# Patient Record
Sex: FEMALE | Race: WHITE | NOT HISPANIC OR LATINO | Employment: OTHER | URBAN - METROPOLITAN AREA
[De-identification: names, ages, dates, MRNs, and addresses within clinical notes are randomized per-mention and may not be internally consistent; named-entity substitution may affect disease eponyms.]

---

## 2017-01-31 ENCOUNTER — TELEPHONE (OUTPATIENT)
Dept: FAMILY MEDICINE CLINIC | Facility: CLINIC | Age: 72
End: 2017-01-31

## 2017-01-31 NOTE — TELEPHONE ENCOUNTER
INFORMED PT    ----- Message from Mark Galicia Jr., MD sent at 1/31/2017  4:53 PM EST -----  Contact: PATIENT [phone]  Suggest restricts salt intake get daily weights for the next several days if there is getting worse let us know.  ----- Message -----     From: Germaine Elias MA     Sent: 1/31/2017   2:18 PM       To: Mark Galicia Jr., MD        ----- Message -----     From: Karon Zazueta     Sent: 1/31/2017   2:14 PM       To: Germaine Elias MA    PATIENT IS HAVING SWELLING IN FEET AND ANKLES AND WANTS TO KNOW WHAT SHE NEEDS TO DO. PATIENT HAS AN APPOINTMENT TO COME IN ON THE 13 TH

## 2017-02-13 ENCOUNTER — OFFICE VISIT (OUTPATIENT)
Dept: FAMILY MEDICINE CLINIC | Facility: CLINIC | Age: 72
End: 2017-02-13

## 2017-02-13 VITALS
WEIGHT: 206 LBS | BODY MASS INDEX: 35.17 KG/M2 | HEIGHT: 64 IN | SYSTOLIC BLOOD PRESSURE: 190 MMHG | OXYGEN SATURATION: 96 % | TEMPERATURE: 97.5 F | HEART RATE: 90 BPM | DIASTOLIC BLOOD PRESSURE: 108 MMHG

## 2017-02-13 DIAGNOSIS — R60.0 BILATERAL LEG EDEMA: ICD-10-CM

## 2017-02-13 DIAGNOSIS — I10 HYPERTENSION, ESSENTIAL: Primary | ICD-10-CM

## 2017-02-13 PROCEDURE — 99213 OFFICE O/P EST LOW 20 MIN: CPT | Performed by: INTERNAL MEDICINE

## 2017-02-13 RX ORDER — CLONIDINE HYDROCHLORIDE 0.2 MG/1
0.2 TABLET ORAL 2 TIMES DAILY
Qty: 60 TABLET | Refills: 2 | Status: SHIPPED | OUTPATIENT
Start: 2017-02-13 | End: 2017-07-06 | Stop reason: SDUPTHER

## 2017-02-13 RX ORDER — RANITIDINE 150 MG/1
150 TABLET ORAL 2 TIMES DAILY
Qty: 60 TABLET | Refills: 11 | Status: SHIPPED | OUTPATIENT
Start: 2017-02-13 | End: 2018-04-21 | Stop reason: SDUPTHER

## 2017-02-13 RX ORDER — TRIAMCINOLONE ACETONIDE 1 MG/G
CREAM TOPICAL 2 TIMES DAILY
Qty: 28.4 G | Refills: 2 | Status: SHIPPED | OUTPATIENT
Start: 2017-02-13 | End: 2022-01-13 | Stop reason: SDUPTHER

## 2017-02-13 NOTE — PROGRESS NOTES
Subjective   Jeanie Tristan is a 71 y.o. female.   Blood pressure, side effects from higher dose of clonidine cannot handle drowsiness.  Is been taking whole tablet in the evening and half tablet only the daytime.  Also is having some increasing edema of her lower extremities.  Patient has has absent water pill.  Does think that she had a house with HCTZ.  Was on a stronger water pill perhaps Lasix she knows she stopped it thinks maybe us for itching but no true rash with that.  Patient does report a true sulfa allergy in addition.  Legs to decrease in size at night.  History of Present Illness   No clearcut explanation of leg edema no history of DVT or any venous insufficiency problems.    Review of Systems   Cardiovascular: Positive for leg swelling.   All other systems reviewed and are negative.      Objective   Vitals:    02/13/17 1457   BP: (!) 190/108   Pulse: 90   Temp: 97.5 °F (36.4 °C)   SpO2: 96%   Weight: 206 lb (93.4 kg)     Physical Exam   Constitutional: She appears well-developed and well-nourished.   Cardiovascular: Normal rate, regular rhythm and normal heart sounds.    Pulmonary/Chest: Effort normal and breath sounds normal.   Musculoskeletal:   Legs 1-2+ bilaterally left leg slightly worse.  No cords no Homans.   Nursing note and vitals reviewed.      No results found for: INR    Procedures    Assessment/Plan  1.  Hypertension suboptimally controlled plan change clonidine to 0.2 mg twice a day.  We will add a another pill specifically diuretic in the setting to take care of both leg edema and the blood pressure.  2.  Leg edema diuretic  Add edecrin 25 mg daily this is only diuretic that MRI would existence that does not have a sulfa derivative.  Fortuitously is now back on the market.  Plan BMP 2wk                                                                                     tMuch of this encounter note is an electronic transcription/translation of spoken language to printed text.  The  electronic translation of spoken language may permit erroneous, or at times, nonsensical words or phrases to be inadvertently transcribed.  Although I have reviewed the note for such errors, some may still exist.

## 2017-02-22 RX ORDER — BUSPIRONE HYDROCHLORIDE 15 MG/1
TABLET ORAL
Qty: 60 TABLET | Refills: 0 | Status: SHIPPED | OUTPATIENT
Start: 2017-02-22 | End: 2017-05-01 | Stop reason: SDUPTHER

## 2017-05-01 RX ORDER — BUSPIRONE HYDROCHLORIDE 15 MG/1
TABLET ORAL
Qty: 60 TABLET | Refills: 0 | Status: SHIPPED | OUTPATIENT
Start: 2017-05-01 | End: 2017-08-04 | Stop reason: SDUPTHER

## 2017-07-06 RX ORDER — CLONIDINE HYDROCHLORIDE 0.2 MG/1
TABLET ORAL
Qty: 60 TABLET | Refills: 2 | Status: SHIPPED | OUTPATIENT
Start: 2017-07-06 | End: 2017-11-06 | Stop reason: SDUPTHER

## 2017-07-27 ENCOUNTER — OFFICE VISIT (OUTPATIENT)
Dept: FAMILY MEDICINE CLINIC | Facility: CLINIC | Age: 72
End: 2017-07-27

## 2017-07-27 VITALS
HEART RATE: 95 BPM | BODY MASS INDEX: 34.83 KG/M2 | TEMPERATURE: 97.9 F | WEIGHT: 204 LBS | OXYGEN SATURATION: 98 % | HEIGHT: 64 IN | SYSTOLIC BLOOD PRESSURE: 172 MMHG | DIASTOLIC BLOOD PRESSURE: 102 MMHG

## 2017-07-27 DIAGNOSIS — N30.00 ACUTE CYSTITIS WITHOUT HEMATURIA: Primary | ICD-10-CM

## 2017-07-27 DIAGNOSIS — R32 URINARY INCONTINENCE IN FEMALE: ICD-10-CM

## 2017-07-27 LAB
BACTERIA UR QL AUTO: ABNORMAL /HPF
BILIRUB UR QL STRIP: NEGATIVE
CLARITY UR: CLEAR
COLOR UR: YELLOW
GLUCOSE UR STRIP-MCNC: NEGATIVE MG/DL
HGB UR QL STRIP.AUTO: ABNORMAL
KETONES UR QL STRIP: NEGATIVE
LEUKOCYTE ESTERASE UR QL STRIP.AUTO: NEGATIVE
NITRITE UR QL STRIP: NEGATIVE
PH UR STRIP.AUTO: 5.5 [PH] (ref 4.6–8)
PROT UR QL STRIP: NEGATIVE
RBC # UR: ABNORMAL /HPF
REF LAB TEST METHOD: ABNORMAL
SP GR UR STRIP: <=1.005 (ref 1–1.03)
SQUAMOUS #/AREA URNS HPF: ABNORMAL /HPF
UROBILINOGEN UR QL STRIP: ABNORMAL
WBC UR QL AUTO: ABNORMAL /HPF

## 2017-07-27 PROCEDURE — 81001 URINALYSIS AUTO W/SCOPE: CPT | Performed by: INTERNAL MEDICINE

## 2017-07-27 PROCEDURE — 99213 OFFICE O/P EST LOW 20 MIN: CPT | Performed by: INTERNAL MEDICINE

## 2017-07-27 RX ORDER — NAPROXEN SODIUM 220 MG
220 TABLET ORAL 2 TIMES DAILY PRN
COMMUNITY
End: 2021-07-07

## 2017-07-27 RX ORDER — CIPROFLOXACIN 250 MG/1
250 TABLET, FILM COATED ORAL 2 TIMES DAILY
Qty: 14 TABLET | Refills: 0 | Status: SHIPPED | OUTPATIENT
Start: 2017-07-27 | End: 2017-08-03

## 2017-07-27 NOTE — PROGRESS NOTES
Subjective   Jeanie Tristan is a 72 y.o. female.   Uriner frequency last few days, he has going q2h while awake sometimes night when sleeping  History of Present Illness   Known microscopic hematuria with prior workup for same history of UTIs (drug sensitivities states can only take Cipro.  Also has a dropped bladder with incontinence would like to get [facility] consult with [facility] at their [facility] office.  No associated temperature with above    Review of Systems   Gastrointestinal: Positive for diarrhea.   Genitourinary: Positive for frequency.   All other systems reviewed and are negative.      Objective   Vitals:    07/27/17 1503   BP: (!) 172/102   Pulse: 95   Temp: 97.9 °F (36.6 °C)   SpO2: 98%   Weight: 204 lb (92.5 kg)     Physical Exam   Cardiovascular: Normal rate, regular rhythm and normal heart sounds.    Pulmonary/Chest: Effort normal and breath sounds normal.   Abdominal: Soft. Bowel sounds are normal.   Nursing note and vitals reviewed.      No results found for: INR    Procedures    Assessment/Plan   1.  UTI plan Cipro 250 twice a day ×7 days time  Follow-up not well in 7 days time  2.  Dropped bladder/urinary continence female plan sent to [facility] at their [facility] office per patient's request    Much of this encounter note is an electronic transcription/translation of spoken language to printed text.  The electronic translation of spoken language may permit erroneous, or at times, nonsensical words or phrases to be inadvertently transcribed.  Although I have reviewed the note for such errors, some may still exist.    [facility] [address].

## 2017-08-04 RX ORDER — BUSPIRONE HYDROCHLORIDE 15 MG/1
TABLET ORAL
Qty: 60 TABLET | Refills: 0 | Status: SHIPPED | OUTPATIENT
Start: 2017-08-04 | End: 2018-02-02 | Stop reason: SDUPTHER

## 2017-09-05 RX ORDER — BUSPIRONE HYDROCHLORIDE 15 MG/1
TABLET ORAL
Qty: 60 TABLET | Refills: 0 | Status: SHIPPED | OUTPATIENT
Start: 2017-09-05 | End: 2018-10-18 | Stop reason: SDUPTHER

## 2017-11-07 RX ORDER — CLONIDINE HYDROCHLORIDE 0.2 MG/1
TABLET ORAL
Qty: 60 TABLET | Refills: 2 | Status: SHIPPED | OUTPATIENT
Start: 2017-11-07 | End: 2018-03-14 | Stop reason: SDUPTHER

## 2018-02-02 RX ORDER — BUSPIRONE HYDROCHLORIDE 15 MG/1
TABLET ORAL
Qty: 60 TABLET | Refills: 0 | Status: SHIPPED | OUTPATIENT
Start: 2018-02-02 | End: 2018-04-21 | Stop reason: SDUPTHER

## 2018-02-27 ENCOUNTER — TELEPHONE (OUTPATIENT)
Dept: FAMILY MEDICINE CLINIC | Facility: CLINIC | Age: 73
End: 2018-02-27

## 2018-02-27 NOTE — TELEPHONE ENCOUNTER
Will check with the others, will probably need be seen first and we'll probably need to have this as a diagnostic mammogram let me think this over today get back with you tomorrow.

## 2018-02-27 NOTE — TELEPHONE ENCOUNTER
PT CALLED [facility] TO SCHEDULE A MAMMOGRAM. AFTER TELLING THEM WHAT SHE NEEDED IT FOR, NURSE SUGGEST SHE CALL HER PCP AND EXPLAIN HER SYMPTOMS AND SEE IF [facility] WANTED TO ORDER MORE THAN JUST A MAMMOGRAM.  PT GETTING PAINS IN LEFT BREAST, NEEDLE PRICK LIKE FEELS. ALSO NIPPLE IS RETRACTED AND PT FEELS LUMP BEHIND NIPPLE. NO HISTORY OF BREAST CANCER IN FAMILY PER PT

## 2018-03-01 DIAGNOSIS — N64.4 BREAST PAIN: Primary | ICD-10-CM

## 2018-03-09 DIAGNOSIS — N64.4 BREAST PAIN: Primary | ICD-10-CM

## 2018-03-14 RX ORDER — CLONIDINE HYDROCHLORIDE 0.2 MG/1
TABLET ORAL
Qty: 60 TABLET | Refills: 2 | Status: SHIPPED | OUTPATIENT
Start: 2018-03-14 | End: 2018-07-25 | Stop reason: SDUPTHER

## 2018-03-22 DIAGNOSIS — N64.4 BREAST PAIN, LEFT: Primary | ICD-10-CM

## 2018-04-23 RX ORDER — BUSPIRONE HYDROCHLORIDE 15 MG/1
TABLET ORAL
Qty: 60 TABLET | Refills: 0 | Status: SHIPPED | OUTPATIENT
Start: 2018-04-23 | End: 2018-06-17 | Stop reason: SDUPTHER

## 2018-04-23 RX ORDER — RANITIDINE 150 MG/1
TABLET ORAL
Qty: 60 TABLET | Refills: 11 | Status: SHIPPED | OUTPATIENT
Start: 2018-04-23 | End: 2019-05-02 | Stop reason: SDUPTHER

## 2018-06-18 RX ORDER — BUSPIRONE HYDROCHLORIDE 15 MG/1
TABLET ORAL
Qty: 60 TABLET | Refills: 0 | Status: SHIPPED | OUTPATIENT
Start: 2018-06-18 | End: 2018-10-18 | Stop reason: SDUPTHER

## 2018-07-12 ENCOUNTER — TELEPHONE (OUTPATIENT)
Dept: FAMILY MEDICINE CLINIC | Facility: CLINIC | Age: 73
End: 2018-07-12

## 2018-07-19 DIAGNOSIS — M17.0 OSTEOARTHRITIS OF BOTH KNEES, UNSPECIFIED OSTEOARTHRITIS TYPE: Primary | ICD-10-CM

## 2018-07-23 RX ORDER — CLONIDINE HYDROCHLORIDE 0.2 MG/1
TABLET ORAL
Qty: 60 TABLET | Refills: 2 | OUTPATIENT
Start: 2018-07-23

## 2018-07-23 RX ORDER — BUSPIRONE HYDROCHLORIDE 15 MG/1
TABLET ORAL
Qty: 60 TABLET | Refills: 0 | OUTPATIENT
Start: 2018-07-23

## 2018-07-25 RX ORDER — CLONIDINE HYDROCHLORIDE 0.2 MG/1
TABLET ORAL
Qty: 60 TABLET | Refills: 2 | Status: SHIPPED | OUTPATIENT
Start: 2018-07-25 | End: 2018-11-16 | Stop reason: SDUPTHER

## 2018-08-13 RX ORDER — BUSPIRONE HYDROCHLORIDE 15 MG/1
TABLET ORAL
Qty: 60 TABLET | Refills: 0 | OUTPATIENT
Start: 2018-08-13

## 2018-08-24 ENCOUNTER — TELEPHONE (OUTPATIENT)
Dept: FAMILY MEDICINE CLINIC | Facility: CLINIC | Age: 73
End: 2018-08-24

## 2018-08-24 NOTE — TELEPHONE ENCOUNTER
[facility] IS SEEING THIS PT AND HER INSURANCE IS REQUIRING HER PCP TO REQUEST A REFERRAL FOR THEM TO SEE HER.    THEY NEED YOU TO CALL [phone] AND ASK FOR A REFERRAL FOR [facility] AND SHE NEEDS A KNEE JOINT INJECTION.    SHE HAS [employer]

## 2018-10-05 RX ORDER — BUSPIRONE HYDROCHLORIDE 15 MG/1
TABLET ORAL
Qty: 60 TABLET | Refills: 0 | OUTPATIENT
Start: 2018-10-05

## 2018-10-18 ENCOUNTER — RESULTS ENCOUNTER (OUTPATIENT)
Dept: FAMILY MEDICINE CLINIC | Facility: CLINIC | Age: 73
End: 2018-10-18

## 2018-10-18 ENCOUNTER — OFFICE VISIT (OUTPATIENT)
Dept: FAMILY MEDICINE CLINIC | Facility: CLINIC | Age: 73
End: 2018-10-18

## 2018-10-18 VITALS
BODY MASS INDEX: 35.78 KG/M2 | HEART RATE: 98 BPM | SYSTOLIC BLOOD PRESSURE: 160 MMHG | TEMPERATURE: 97.5 F | OXYGEN SATURATION: 96 % | WEIGHT: 209.6 LBS | HEIGHT: 64 IN | DIASTOLIC BLOOD PRESSURE: 100 MMHG

## 2018-10-18 DIAGNOSIS — Z00.00 MEDICARE ANNUAL WELLNESS VISIT, INITIAL: Primary | ICD-10-CM

## 2018-10-18 DIAGNOSIS — Z12.11 SCREEN FOR COLON CANCER: ICD-10-CM

## 2018-10-18 DIAGNOSIS — R35.0 URINARY FREQUENCY: ICD-10-CM

## 2018-10-18 DIAGNOSIS — Z13.220 LIPID SCREENING: ICD-10-CM

## 2018-10-18 DIAGNOSIS — Z82.49 FH: CAD (CORONARY ARTERY DISEASE): ICD-10-CM

## 2018-10-18 DIAGNOSIS — I10 HYPERTENSION, ESSENTIAL: ICD-10-CM

## 2018-10-18 LAB
ALBUMIN SERPL-MCNC: 4.3 G/DL (ref 3.5–5.2)
ALBUMIN/GLOB SERPL: 1.2 G/DL
ALP SERPL-CCNC: 92 U/L (ref 39–117)
ALT SERPL W P-5'-P-CCNC: 22 U/L (ref 1–33)
ANION GAP SERPL CALCULATED.3IONS-SCNC: 12.3 MMOL/L
AST SERPL-CCNC: 19 U/L (ref 1–32)
BILIRUB SERPL-MCNC: 0.7 MG/DL (ref 0.1–1.2)
BUN BLD-MCNC: 18 MG/DL (ref 8–23)
BUN/CREAT SERPL: 22.2 (ref 7–25)
CALCIUM SPEC-SCNC: 9.5 MG/DL (ref 8.6–10.5)
CHLORIDE SERPL-SCNC: 102 MMOL/L (ref 98–107)
CHOLEST SERPL-MCNC: 208 MG/DL (ref 0–200)
CO2 SERPL-SCNC: 29.7 MMOL/L (ref 22–29)
CREAT BLD-MCNC: 0.81 MG/DL (ref 0.57–1)
GFR SERPL CREATININE-BSD FRML MDRD: 69 ML/MIN/1.73
GLOBULIN UR ELPH-MCNC: 3.6 GM/DL
GLUCOSE BLD-MCNC: 94 MG/DL (ref 65–99)
HDLC SERPL-MCNC: 49 MG/DL (ref 40–60)
LDLC SERPL CALC-MCNC: 135 MG/DL (ref 0–100)
LDLC/HDLC SERPL: 2.75 {RATIO}
POTASSIUM BLD-SCNC: 3.9 MMOL/L (ref 3.5–5.2)
PROT SERPL-MCNC: 7.9 G/DL (ref 6–8.5)
SODIUM BLD-SCNC: 144 MMOL/L (ref 136–145)
TRIGL SERPL-MCNC: 121 MG/DL (ref 0–150)
VLDLC SERPL-MCNC: 24.2 MG/DL (ref 5–40)

## 2018-10-18 PROCEDURE — 80061 LIPID PANEL: CPT | Performed by: INTERNAL MEDICINE

## 2018-10-18 PROCEDURE — 36415 COLL VENOUS BLD VENIPUNCTURE: CPT | Performed by: INTERNAL MEDICINE

## 2018-10-18 PROCEDURE — 96160 PT-FOCUSED HLTH RISK ASSMT: CPT | Performed by: INTERNAL MEDICINE

## 2018-10-18 PROCEDURE — 99214 OFFICE O/P EST MOD 30 MIN: CPT | Performed by: INTERNAL MEDICINE

## 2018-10-18 PROCEDURE — 80053 COMPREHEN METABOLIC PANEL: CPT | Performed by: INTERNAL MEDICINE

## 2018-10-18 PROCEDURE — G0438 PPPS, INITIAL VISIT: HCPCS | Performed by: INTERNAL MEDICINE

## 2018-10-18 RX ORDER — ACETAMINOPHEN 500 MG
500 TABLET ORAL EVERY 6 HOURS PRN
COMMUNITY

## 2018-10-18 RX ORDER — ANASTROZOLE 1 MG/1
1 TABLET ORAL
COMMUNITY
Start: 2018-06-05 | End: 2022-07-29

## 2018-10-18 RX ORDER — BUSPIRONE HYDROCHLORIDE 15 MG/1
15 TABLET ORAL 2 TIMES DAILY
Qty: 60 TABLET | Refills: 5 | Status: SHIPPED | OUTPATIENT
Start: 2018-10-18 | End: 2019-07-29 | Stop reason: SDUPTHER

## 2018-10-18 RX ORDER — UBIDECARENONE 75 MG
CAPSULE ORAL
COMMUNITY
Start: 2018-10-03

## 2018-10-18 RX ORDER — CALCIUM CARBONATE 200(500)MG
TABLET,CHEWABLE ORAL
COMMUNITY
Start: 2018-08-31 | End: 2022-07-29

## 2018-10-18 NOTE — PROGRESS NOTES
QUICK REFERENCE INFORMATION:  The ABCs of the Annual Wellness Visit    Initial Medicare Visit    HEALTH RISK ASSESSMENT    1945    Recent Hospitalizations:  Recently treated at the following:  Other: [facility]  breast cancer.      Current Medical Providers:  Patient Care Team:  Mark Galicia Jr., MD as PCP - General      Smoking Status:  History   Smoking Status   • Former Smoker   Smokeless Tobacco   • Never Used       Alcohol Consumption:  History   Alcohol Use No       Depression Screen:   No flowsheet data found.    Health Habits and Functional and Cognitive Screening:  No flowsheet data found.        Does the patient have evidence of cognitive impairment? No    Aspirin use counseling? Start ASA 81 mg daily       Recent Lab Results:  CMP:  Lab Results   Component Value Date    BUN 19 12/19/2016    CREATININE 0.74 12/19/2016    EGFRIFNONA 77 12/19/2016    BCR 25.7 (H) 12/19/2016     12/19/2016    K 4.0 12/19/2016    CO2 29.3 (H) 12/19/2016    CALCIUM 9.1 12/19/2016     Lipid Panel:     HbA1c:       Visual Acuity:  No exam data present    Age-appropriate Screening Schedule:  Refer to the list below for future screening recommendations based on patient's age, sex and/or medical conditions. Orders for these recommended tests are listed in the plan section. The patient has been provided with a written plan.    Health Maintenance   Topic Date Due   • TDAP/TD VACCINES (1 - Tdap) 03/21/1963   • ZOSTER VACCINE (1 of 2) 03/21/1994   • PNEUMOCOCCAL VACCINES (65+ LOW/MEDIUM RISK) (1 of 2 - PCV13) 03/21/2009   • COLONOSCOPY  04/15/2016   • INFLUENZA VACCINE  08/01/2018   • MAMMOGRAM  04/05/2020        Subjective   History of Present Illness    Jeanie Tristan is a 73 y.o. female an established patient presenting for an Initial Medicare Visit.     The following portions of the patient's history were reviewed and updated as appropriate: allergies, current medications, past family history, past medical history,  "past surgical history and problem list.    Outpatient Medications Prior to Visit   Medication Sig Dispense Refill   • CloNIDine (CATAPRES) 0.2 MG tablet take 1 tablet by mouth twice a day 60 tablet 2   • naproxen sodium (ALEVE) 220 MG tablet Take 220 mg by mouth 2 (Two) Times a Day As Needed for Mild Pain (1-3).     • raNITIdine (ZANTAC) 150 MG tablet take 1 tablet by mouth twice a day 60 tablet 11   • busPIRone (BUSPAR) 15 MG tablet take 0.5 tablet by mouth every MORNING and 1 EVERY EVENING FOR 7DAYS THEN 1 TABLET TWICE A DAY 60 tablet 0   • triamcinolone (KENALOG) 0.1 % cream Apply  topically 2 (Two) Times a Day. 28.4 g 2   • busPIRone (BUSPAR) 15 MG tablet take 1/2 tablet by mouth every morning and 1 tablet every evening 60 tablet 0     No facility-administered medications prior to visit.        There is no problem list on file for this patient.      Advance Care Planning:  has NO advance directive - information provided to the patient today    Identification of Risk Factors:  Risk factors include: weight , lack of transportation, caretaker stress and depression.    Review of Systems    Compared to one year ago, the patient feels her physical health is worse.  Compared to one year ago, the patient feels her mental health is the same.    Objective    Physical Exam    Vitals:    10/18/18 1531   BP: 160/100   BP Location: Left arm   Patient Position: Sitting   Cuff Size: Adult   Pulse: 98   Temp: 97.5 °F (36.4 °C)   TempSrc: Oral   SpO2: 96%   Weight: 95.1 kg (209 lb 9.6 oz)   Height: 162.6 cm (64\")   PainSc:   2   PainLoc: Breast       Patient's Body mass index is 35.98 kg/m². BMI is above normal parameters. Recommendations include: nutrition counseling.      Procedure   Procedures       Assessment/Plan   Patient Self-Management and Personalized Health Advice  The patient has been provided with information about: diet and exercise and preventive services including:   · Advance directive, Colorectal cancer " screening, cologuard test ordered.    Visit Diagnoses:    ICD-10-CM ICD-9-CM   1. Urinary frequency R35.0 788.41   2. Hypertension, essential I10 401.9   3. Screen for colon cancer Z12.11 V76.51       Orders Placed This Encounter   Procedures   • Cologuard - Stool, Per Rectum     Standing Status:   Future     Standing Expiration Date:   10/18/2019       Outpatient Encounter Prescriptions as of 10/18/2018   Medication Sig Dispense Refill   • acetaminophen (TYLENOL) 500 MG tablet Take 500 mg by mouth Every 6 (Six) Hours As Needed for Mild Pain .     • anastrozole (ARIMIDEX) 1 MG tablet Take 1 mg by mouth.     • busPIRone (BUSPAR) 15 MG tablet Take 1 tablet by mouth 2 (Two) Times a Day. 60 tablet 5   • calcium carbonate (TUMS) 500 MG chewable tablet      • Calcium Carbonate-Vitamin D (OSCAL-500) 500-400 MG-UNIT per tablet Take  by mouth.     • CloNIDine (CATAPRES) 0.2 MG tablet take 1 tablet by mouth twice a day 60 tablet 2   • naproxen sodium (ALEVE) 220 MG tablet Take 220 mg by mouth 2 (Two) Times a Day As Needed for Mild Pain (1-3).     • raNITIdine (ZANTAC) 150 MG tablet take 1 tablet by mouth twice a day 60 tablet 11   • vitamin B-12 (CYANOCOBALAMIN) 100 MCG tablet      • [DISCONTINUED] busPIRone (BUSPAR) 15 MG tablet take 0.5 tablet by mouth every MORNING and 1 EVERY EVENING FOR 7DAYS THEN 1 TABLET TWICE A DAY 60 tablet 0   • triamcinolone (KENALOG) 0.1 % cream Apply  topically 2 (Two) Times a Day. 28.4 g 2   • [DISCONTINUED] busPIRone (BUSPAR) 15 MG tablet take 1/2 tablet by mouth every morning and 1 tablet every evening 60 tablet 0     No facility-administered encounter medications on file as of 10/18/2018.        Reviewed use of high risk medication in the elderly: not applicable  Reviewed for potential of harmful drug interactions in the elderly: not applicable    Follow Up:  No Follow-up on file.     An After Visit Summary and PPPS with all of these plans were given to the patient.

## 2018-10-18 NOTE — PATIENT INSTRUCTIONS
Medicare Wellness  Personal Prevention Plan of Service     Date of Office Visit:  10/18/2018  Encounter Provider:  Mark Galicia MD  Place of Service:  [facility]  Patient Name: Jeanie Tristan  :  1945    As part of the Medicare Wellness portion of your visit today, we are providing you with this personalized preventive plan of services (PPPS). This plan is based upon recommendations of the [place] Preventive Services Task Force (USPSTF) and the Advisory Committee on Immunization Practices (ACIP).    This lists the preventive care services that should be considered, and provides dates of when you are due. Items listed as completed are up-to-date and do not require any further intervention.    Health Maintenance   Topic Date Due   • TDAP/TD VACCINES (1 - Tdap) 1963   • ZOSTER VACCINE (1 of 2) 1994   • PNEUMOCOCCAL VACCINES (65+ LOW/MEDIUM RISK) (1 of 2 - PCV13) 2009   • COLONOSCOPY  04/15/2016   • INFLUENZA VACCINE  2018   • MEDICARE ANNUAL WELLNESS  10/18/2019   • MAMMOGRAM  2020       Orders Placed This Encounter   Procedures   • Cologuard - Stool, Per Rectum     Standing Status:   Future     Standing Expiration Date:   10/18/2019       No Follow-up on file.

## 2018-10-18 NOTE — PROGRESS NOTES
Subjective   Jeanie Tristan is a 73 y.o. female.  Medicare wellness exam also follow-up on blood pressure having urine frequency    History of Present Illness Medicare wellness exam BLOOD pressure some increased urine frequency patient's blood pressure is very satisfactory at home does have whitecoat syndrome with readings [id number] today she will continue to monitor at home and thinks this is her historical whitecoat syndrome presentation patient is has Medicare wellness request cologuard for colon screening which we'll be happy to do.  Did have family history of heart sees results significant father age 66 will get updated lipid screening on patient.  Overalls doing fairly well Cipro urine frequency no specific complaints .  Is interested urogynecology consult  Exercises some  Start ec ASA 81 mg daily qdbp 130/70  Has white cost syndrome does complain mild breast discomfort at surgical site from breast biopsy/surgery.  Did suggest she follow-up with her surgeon on that Dr. Davis.  She will think about that thinks she'll see her hematologist oncologist instead.  Patient with multiple drug allergies positive family history for heart disease, Alzheimer's, specified cancer.  Patient is a nonsmoker nondrinker  Review of Systems   Genitourinary: Positive for urgency.   All other systems reviewed and are negative.      Objective   Vitals:    10/18/18 1531   BP: 160/100   Pulse: 98   Temp: 97.5 °F (36.4 °C)   SpO2: 96%   Weight: 95.1 kg (209 lb 9.6 oz)     Physical Exam   Constitutional: She appears well-developed and well-nourished.   HENT:   Head: Normocephalic and atraumatic.   Eyes: Pupils are equal, round, and reactive to light. Conjunctivae are normal.   Neck:   No carotid bruits   Cardiovascular: Normal rate, regular rhythm and normal heart sounds.    Pulmonary/Chest: Effort normal and breath sounds normal.   Abdominal: Soft. Bowel sounds are normal.   Neurological: She is alert.   Unremarkable gait and station    Skin: Skin is warm and dry.       No results found for: INR    Procedures    Assessment/Plan   1.  Medicare wellness exam    2.  Hypertension continue present medicine recheck in 6-12 months    3.  Encounter for colon screening plan cologuard    4.  Lipid screening await pending lipids    5.  Family history for coronary artery disease    6.  Urine frequency plan we will try to get get formal recommendation to urogynecology.    Much of this encounter note is an electronic transcription/translation of spoken language to printed text.  The electronic translation of spoken language may permit erroneous, or at times, nonsensical words or phrases to be inadvertently transcribed.  Although I have reviewed the note for such errors, some may still exist. If there are questions or for further clarification, please contact me.

## 2018-10-23 DIAGNOSIS — E78.5 ELEVATED LIPIDS: Primary | ICD-10-CM

## 2018-11-16 RX ORDER — CLONIDINE HYDROCHLORIDE 0.2 MG/1
TABLET ORAL
Qty: 60 TABLET | Refills: 1 | Status: SHIPPED | OUTPATIENT
Start: 2018-11-16 | End: 2019-02-05 | Stop reason: SDUPTHER

## 2019-02-05 RX ORDER — CLONIDINE HYDROCHLORIDE 0.2 MG/1
TABLET ORAL
Qty: 60 TABLET | Refills: 1 | Status: SHIPPED | OUTPATIENT
Start: 2019-02-05 | End: 2019-05-02 | Stop reason: SDUPTHER

## 2019-04-18 ENCOUNTER — TELEPHONE (OUTPATIENT)
Dept: FAMILY MEDICINE CLINIC | Facility: CLINIC | Age: 74
End: 2019-04-18

## 2019-04-18 NOTE — TELEPHONE ENCOUNTER
PATIENT HAS BEEN HAVING ATTACKS WHERE SHE HAS PAIN IN HER CHEST AND BACK. PATIENT HAS BEEN HAVING A LOT OF GAS AND THEN PAIN GOES  AWAY. ONLY HAPPENS WHEN PATIENT EATS CERTAIN FOODS. PATIENT WENT TO THE HOSPITAL FOR THIS BECAUSE SHE THOUGHT IT WAS HER HEART. HOSPITAL CHECKED HER HEART AND HEART IS FINE.

## 2019-04-18 NOTE — TELEPHONE ENCOUNTER
Have patient take a PPI such as Prevacid 30 this may need to be phoned in for her if she is not already on a PPI also have her scheduled appointment.  Also see what hospital she went to help us locate that report

## 2019-05-02 RX ORDER — CLONIDINE HYDROCHLORIDE 0.2 MG/1
TABLET ORAL
Qty: 60 TABLET | Refills: 1 | Status: SHIPPED | OUTPATIENT
Start: 2019-05-02 | End: 2019-07-15 | Stop reason: SDUPTHER

## 2019-05-02 RX ORDER — RANITIDINE 150 MG/1
TABLET ORAL
Qty: 60 TABLET | Refills: 8 | Status: SHIPPED | OUTPATIENT
Start: 2019-05-02 | End: 2021-07-07

## 2019-05-16 ENCOUNTER — OFFICE VISIT (OUTPATIENT)
Dept: FAMILY MEDICINE CLINIC | Facility: CLINIC | Age: 74
End: 2019-05-16

## 2019-05-16 VITALS
SYSTOLIC BLOOD PRESSURE: 180 MMHG | TEMPERATURE: 97.6 F | HEIGHT: 64 IN | HEART RATE: 99 BPM | OXYGEN SATURATION: 97 % | BODY MASS INDEX: 34.66 KG/M2 | WEIGHT: 203 LBS | DIASTOLIC BLOOD PRESSURE: 110 MMHG

## 2019-05-16 DIAGNOSIS — Z85.820 HX OF MALIGNANT MELANOMA: ICD-10-CM

## 2019-05-16 DIAGNOSIS — I10 HYPERTENSION, ESSENTIAL: ICD-10-CM

## 2019-05-16 DIAGNOSIS — R07.9 CHEST PAIN, UNSPECIFIED TYPE: Primary | ICD-10-CM

## 2019-05-16 DIAGNOSIS — R94.31 ABNORMAL EKG: ICD-10-CM

## 2019-05-16 PROCEDURE — 99214 OFFICE O/P EST MOD 30 MIN: CPT | Performed by: INTERNAL MEDICINE

## 2019-05-16 PROCEDURE — 93000 ELECTROCARDIOGRAM COMPLETE: CPT | Performed by: INTERNAL MEDICINE

## 2019-05-16 NOTE — PROGRESS NOTES
Subjective   Jeanie Tristan is a 74 y.o. female.   History of chest pain relieved visit to ER.  Negative evaluation at that point time.  Although several cardiac risk factors  History of Present Illness family history of heart disease without mother and father.  Does have whitecoat syndrome blood pressure runs much better at home.  But with her cuff note she has multiple sensitivities to blood pressure medications actually takes her clonidine 0.2 mg in the evening and 1/2 tablet I 0.1 mg in the morning.  When she takes more she does get drowsy does not recall what happened when she is on Ziac.  But is allergic or sensitive to HCTZ we may consider adding Bystolic if her blood pressure continues to run high the chest pain described as a heavy discomfort lasted and atraumatic switch off all she still in the ER getting work-up done.  With hypertension family history for heart disease including premature heart disease in the family tree will strongly consider getting a stress test equivalent uses cane for ambulation limitations ability to walk therefore probably a PET rubidium.  No shortness of breath at present no chest pain at present.  Has cologuard at home hasnt sent in  A history of heart disease including premature heart disease heart disease in both parents.  Also family history of Alzheimer's disease and cancer undefined.  Patient's former smoker drug allergies are numerous including multiple blood pressure medicines as well as sulfa and penicillin  Patient ot also needs [facility] to [facility] dermatologist retired and they are convenient for her.  She has a history of melanoma is due for her yearly checkup.  Should be reviewed prior to prescribing any new medications.  Cannot take cephalexin either.  Review of Systems    Objective   Vitals:    05/16/19 1612   BP: (!) 180/110   Pulse: 99   Temp: 97.6 °F (36.4 °C)   SpO2: 97%   Weight: 92.1 kg (203 lb)     Physical Exam   Constitutional: She  appears well-developed and well-nourished.   HENT:   Head: Normocephalic and atraumatic.   Eyes: Conjunctivae are normal. Pupils are equal, round, and reactive to light.   Neck:   No carotid bruits left and right carotid 2+   Cardiovascular: Normal rate, regular rhythm and normal heart sounds.   Left and right radial pulses are 2+   Pulmonary/Chest: Effort normal and breath sounds normal.   Abdominal: Soft.   Neurological: She is alert.   Relatively steady gait and station does use cane for ambulation   Skin: Skin is warm and dry.   Nursing note and vitals reviewed.      No results found for: INR      ECG 12 Lead  Date/Time: 5/16/2019 4:31 PM  Performed by: Mark Galicia Jr., MD  Authorized by: Mark Galicia Jr., MD   Previous ECG: no previous ECG available  Rhythm: sinus rhythm  Rate: normal  T elevation: V1 and V2    Clinical impression: abnormal EKG  Comments: EKG shows normal sinus rhythm T wave inversion in V1 and V2.  No comparison available we do have a report from the hospital talk about nonspecific ST-T wave but did not have an actual visual copy thereof.  Rate is sinus at 89 SD interval is 141 MS, QRS is 88, QT was 348 MS.  Abnormal EKG without actual visual comparison.  T wave inversion in V1 V2 are predominant abnormalities.            Assessment/Plan   1.  Chest pain I explained plan PET rubidium and Augmentin per patient's request    2.  Abnormal EKG plan PET rubidium    3.  Hypertension plan check blood pressure readings at home if equivocal will consider getting another agent added either by systolic of which we will give her samples or bisoprolol if blood pressure readings are satisfactory at home I have her swing by to get a blood pressure reading to proceed with PET rubidium prior to starting a potential beta-blocker with affecting the stress test.    4.  History of melanoma [facility]    Much of this encounter note is an electronic  transcription/translation of spoken language to printed text.  The electronic translation of spoken language may permit erroneous, or at times, nonsensical words or phrases to be inadvertently transcribed.  Although I have reviewed the note for such errors, some may still exist. If there are questions or for further clarification, please contact me.

## 2019-07-15 RX ORDER — CLONIDINE HYDROCHLORIDE 0.2 MG/1
TABLET ORAL
Qty: 60 TABLET | Refills: 1 | Status: SHIPPED | OUTPATIENT
Start: 2019-07-15 | End: 2019-10-09 | Stop reason: SDUPTHER

## 2019-07-29 RX ORDER — BUSPIRONE HYDROCHLORIDE 15 MG/1
TABLET ORAL
Qty: 180 TABLET | Refills: 3 | Status: SHIPPED | OUTPATIENT
Start: 2019-07-29 | End: 2020-08-12

## 2019-10-09 RX ORDER — CLONIDINE HYDROCHLORIDE 0.2 MG/1
TABLET ORAL
Qty: 60 TABLET | Refills: 1 | Status: SHIPPED | OUTPATIENT
Start: 2019-10-09 | End: 2019-12-27

## 2019-10-31 ENCOUNTER — TELEPHONE (OUTPATIENT)
Dept: FAMILY MEDICINE CLINIC | Facility: CLINIC | Age: 74
End: 2019-10-31

## 2019-10-31 NOTE — TELEPHONE ENCOUNTER
We do not notarize here. Lm informing pt form is up front, Dr. Galicia signed it she will have to pick it up and take it to the dmv and they can notarize it there.

## 2019-10-31 NOTE — TELEPHONE ENCOUNTER
----- Message from Nikkie Paniagua sent at 10/31/2019  9:03 AM EDT -----  Regarding: FW: Handcap Tag Renewal  Germaine, please call this patient.  ----- Message -----  From: Saida Palmer RN  Sent: 10/30/2019   5:14 PM  To: Nikkie Paniagua  Subject: Handcap Tag Renewal                              Patient needs new application with physician notarized this year per Florin. Could the office please call Jeanie Tristan back tomorrow? (Patient would like to be called afternoon. She sleeps late).     Thanks!   Saida

## 2019-12-27 RX ORDER — CLONIDINE HYDROCHLORIDE 0.2 MG/1
TABLET ORAL
Qty: 180 TABLET | Refills: 1 | Status: SHIPPED | OUTPATIENT
Start: 2019-12-27 | End: 2020-08-12

## 2020-06-04 RX ORDER — FAMOTIDINE 20 MG/1
20 TABLET, FILM COATED ORAL 2 TIMES DAILY
Qty: 180 TABLET | Refills: 3 | Status: SHIPPED | OUTPATIENT
Start: 2020-06-04 | End: 2021-06-30

## 2020-08-12 RX ORDER — BUSPIRONE HYDROCHLORIDE 15 MG/1
TABLET ORAL
Qty: 180 TABLET | Refills: 3 | Status: SHIPPED | OUTPATIENT
Start: 2020-08-12 | End: 2021-07-07 | Stop reason: SDUPTHER

## 2020-08-12 RX ORDER — CLONIDINE HYDROCHLORIDE 0.2 MG/1
TABLET ORAL
Qty: 180 TABLET | Refills: 1 | Status: SHIPPED | OUTPATIENT
Start: 2020-08-12 | End: 2021-04-23 | Stop reason: SDUPTHER

## 2020-09-16 DIAGNOSIS — M79.673 PAIN OF FOOT, UNSPECIFIED LATERALITY: Primary | ICD-10-CM

## 2021-03-02 DIAGNOSIS — Z23 IMMUNIZATION DUE: ICD-10-CM

## 2021-04-23 RX ORDER — CLONIDINE HYDROCHLORIDE 0.2 MG/1
0.2 TABLET ORAL 2 TIMES DAILY
Qty: 30 TABLET | Refills: 0 | Status: SHIPPED | OUTPATIENT
Start: 2021-04-23 | End: 2021-06-11 | Stop reason: SDUPTHER

## 2021-04-23 NOTE — TELEPHONE ENCOUNTER
Caller: Jeanie Tristan    Relationship: Self    Best call back number: [phone]    Medication needed:   Requested Prescriptions     Pending Prescriptions Disp Refills   • cloNIDine (CATAPRES) 0.2 MG tablet 180 tablet 1     Sig: Take 1 tablet by mouth 2 (Two) Times a Day.       When do you need the refill by: ASAP     What additional details did the patient provide when requesting the medication: SHE HAS ONLY 15 LEFT AND IS OFF BOARDING FROM [facility] AND NEEDS THIS MEDICATION REFILLED FROM NEW PROVIDER    Does the patient have less than a 3 day supply:  [] Yes  [x] No    What is the patient's preferred pharmacy: [facility] #[id number] - [address] [phone] [facility] [phone]

## 2021-05-18 ENCOUNTER — ON CAMPUS - OUTPATIENT (OUTPATIENT)
Dept: URBAN - METROPOLITAN AREA HOSPITAL 108 | Facility: HOSPITAL | Age: 76
End: 2021-05-18
Payer: MEDICARE

## 2021-05-18 ENCOUNTER — ON CAMPUS - OUTPATIENT (OUTPATIENT)
Dept: URBAN - METROPOLITAN AREA HOSPITAL 108 | Facility: HOSPITAL | Age: 76
End: 2021-05-18
Payer: MEDICAID

## 2021-05-18 DIAGNOSIS — K21.9 GASTRO-ESOPHAGEAL REFLUX DISEASE WITHOUT ESOPHAGITIS: ICD-10-CM

## 2021-05-18 DIAGNOSIS — R11.0 NAUSEA: ICD-10-CM

## 2021-05-18 DIAGNOSIS — R63.0 ANOREXIA: ICD-10-CM

## 2021-05-18 PROCEDURE — 99203 OFFICE O/P NEW LOW 30 MIN: CPT | Performed by: INTERNAL MEDICINE

## 2021-05-18 PROCEDURE — 99222 1ST HOSP IP/OBS MODERATE 55: CPT | Performed by: INTERNAL MEDICINE

## 2021-05-19 ENCOUNTER — ON CAMPUS - OUTPATIENT (OUTPATIENT)
Dept: URBAN - METROPOLITAN AREA HOSPITAL 108 | Facility: HOSPITAL | Age: 76
End: 2021-05-19

## 2021-05-19 DIAGNOSIS — K29.50 UNSPECIFIED CHRONIC GASTRITIS WITHOUT BLEEDING: ICD-10-CM

## 2021-05-19 DIAGNOSIS — K21.9 GASTRO-ESOPHAGEAL REFLUX DISEASE WITHOUT ESOPHAGITIS: ICD-10-CM

## 2021-05-19 DIAGNOSIS — K29.80 DUODENITIS WITHOUT BLEEDING: ICD-10-CM

## 2021-05-19 DIAGNOSIS — R11.0 NAUSEA: ICD-10-CM

## 2021-05-19 DIAGNOSIS — K44.9 DIAPHRAGMATIC HERNIA WITHOUT OBSTRUCTION OR GANGRENE: ICD-10-CM

## 2021-05-19 DIAGNOSIS — K22.2 ESOPHAGEAL OBSTRUCTION: ICD-10-CM

## 2021-05-19 PROCEDURE — 43239 EGD BIOPSY SINGLE/MULTIPLE: CPT | Performed by: INTERNAL MEDICINE

## 2021-06-04 RX ORDER — CLONIDINE HYDROCHLORIDE 0.2 MG/1
TABLET ORAL
Qty: 30 TABLET | Refills: 0 | OUTPATIENT
Start: 2021-06-04

## 2021-06-11 ENCOUNTER — TELEPHONE (OUTPATIENT)
Dept: FAMILY MEDICINE CLINIC | Facility: CLINIC | Age: 76
End: 2021-06-11

## 2021-06-11 RX ORDER — CLONIDINE HYDROCHLORIDE 0.2 MG/1
0.2 TABLET ORAL 2 TIMES DAILY
Qty: 30 TABLET | Refills: 0 | Status: SHIPPED | OUTPATIENT
Start: 2021-06-11 | End: 2021-06-22 | Stop reason: SDUPTHER

## 2021-06-11 NOTE — TELEPHONE ENCOUNTER
PATIENT WAS TOLD BY PHARMACY IN ORDER TO GET REFILLS OF cloNIDine (CATAPRES) 0.2 MG tablet, WILL NEED TO MAKE AN APPT. PATIENT WILL BE OUT OF MEDICATION TODAY. REQUESTED A 2 WEEK SUPPLY UNTIL SHE COMES TO HER APPT 06/23 @ 3:30    [facility] #[id number] - [facility] [address] AT [facility] & [facility] [phone] [facility] [phone] FX

## 2021-06-22 ENCOUNTER — TELEPHONE (OUTPATIENT)
Dept: FAMILY MEDICINE CLINIC | Facility: CLINIC | Age: 76
End: 2021-06-22

## 2021-06-22 RX ORDER — CLONIDINE HYDROCHLORIDE 0.2 MG/1
0.2 TABLET ORAL 2 TIMES DAILY
Qty: 14 TABLET | Refills: 0 | Status: SHIPPED | OUTPATIENT
Start: 2021-06-22 | End: 2021-06-28 | Stop reason: SDUPTHER

## 2021-06-22 NOTE — TELEPHONE ENCOUNTER
PATIENT STATES SHE HAD A SCOPE DONE A MONTH AGO AND GOT HER RESULTS AND SHE WAS ADVISED SHE HAS H. PYLORI. SHE STATES THEY WILL NOT GIVE HER AN ANTIBIOTIC UNTIL SHE COMES IN TO SEE THEM ON July 1ST.     SHE WOULD LIKE TO HAVE A TWO WEEK EMERGENCY SUPPLY OF cloNIDine (CATAPRES) 0.2 MG tablet SENT TO [facility] ON [address] [facility]. SHE HAS RESCHEDULED HER APPOINTMENT TO July 7TH AT 3PM.    PLEASE ADVISE: [phone]

## 2021-06-28 RX ORDER — CLONIDINE HYDROCHLORIDE 0.2 MG/1
0.2 TABLET ORAL 2 TIMES DAILY
Qty: 14 TABLET | Refills: 0 | Status: SHIPPED | OUTPATIENT
Start: 2021-06-28 | End: 2021-07-07 | Stop reason: SDUPTHER

## 2021-06-28 NOTE — TELEPHONE ENCOUNTER
Caller: Jeanie Tristan    Relationship: Self    Best call back number: [phone]    Medication needed:   Requested Prescriptions     Pending Prescriptions Disp Refills   • cloNIDine (CATAPRES) 0.2 MG tablet 14 tablet 0     Sig: Take 1 tablet by mouth 2 (Two) Times a Day.       When do you need the refill by: 6/28/21    What additional details did the patient provide when requesting the medication: PATIENT IS STILL FEELING NAUSEOUS AND STILL HAS DIARRHEA     Does the patient have less than a 3 day supply:  [x] Yes  [] No    What is the patient's preferred pharmacy: [facility] #[id number] [address] & [facility] [phone] [facility] [phone] FX

## 2021-06-30 RX ORDER — FAMOTIDINE 20 MG/1
TABLET, FILM COATED ORAL
Qty: 60 TABLET | Refills: 0 | Status: SHIPPED | OUTPATIENT
Start: 2021-06-30 | End: 2021-07-07 | Stop reason: SDUPTHER

## 2021-06-30 RX ORDER — FAMOTIDINE 20 MG/1
TABLET, FILM COATED ORAL
Qty: 180 TABLET | OUTPATIENT
Start: 2021-06-30

## 2021-07-01 ENCOUNTER — OFFICE (OUTPATIENT)
Dept: URBAN - METROPOLITAN AREA CLINIC 75 | Facility: CLINIC | Age: 76
End: 2021-07-01

## 2021-07-01 VITALS
WEIGHT: 210 LBS | TEMPERATURE: 97.4 F | SYSTOLIC BLOOD PRESSURE: 205 MMHG | DIASTOLIC BLOOD PRESSURE: 110 MMHG | HEIGHT: 64 IN

## 2021-07-01 DIAGNOSIS — K29.60 OTHER GASTRITIS WITHOUT BLEEDING: ICD-10-CM

## 2021-07-01 DIAGNOSIS — K21.9 GASTRO-ESOPHAGEAL REFLUX DISEASE WITHOUT ESOPHAGITIS: ICD-10-CM

## 2021-07-01 DIAGNOSIS — I10 ESSENTIAL (PRIMARY) HYPERTENSION: ICD-10-CM

## 2021-07-01 DIAGNOSIS — R11.0 NAUSEA: ICD-10-CM

## 2021-07-01 DIAGNOSIS — B96.81 HELICOBACTER PYLORI [H. PYLORI] AS THE CAUSE OF DISEASES CLA: ICD-10-CM

## 2021-07-01 PROCEDURE — 99214 OFFICE O/P EST MOD 30 MIN: CPT | Performed by: NURSE PRACTITIONER

## 2021-07-01 RX ORDER — OMEPRAZOLE 20 MG/1
CAPSULE, DELAYED RELEASE ORAL
Qty: 20 | Refills: 0 | Status: ACTIVE
Start: 2021-07-01

## 2021-07-01 RX ORDER — METRONIDAZOLE 375 MG/1
1500 CAPSULE ORAL
Qty: 40 | Refills: 0 | Status: ACTIVE
Start: 2021-07-01

## 2021-07-01 RX ORDER — TETRACYCLINE HYDROCHLORIDE 500 MG/1
2000 CAPSULE ORAL
Qty: 40 | Refills: 0 | Status: ACTIVE
Start: 2021-07-01

## 2021-07-07 ENCOUNTER — OFFICE VISIT (OUTPATIENT)
Dept: FAMILY MEDICINE CLINIC | Facility: CLINIC | Age: 76
End: 2021-07-07

## 2021-07-07 VITALS
TEMPERATURE: 97.5 F | DIASTOLIC BLOOD PRESSURE: 98 MMHG | HEIGHT: 64 IN | SYSTOLIC BLOOD PRESSURE: 182 MMHG | HEART RATE: 100 BPM | WEIGHT: 207.8 LBS | BODY MASS INDEX: 35.48 KG/M2 | OXYGEN SATURATION: 96 %

## 2021-07-07 DIAGNOSIS — K21.9 GASTROESOPHAGEAL REFLUX DISEASE, UNSPECIFIED WHETHER ESOPHAGITIS PRESENT: ICD-10-CM

## 2021-07-07 DIAGNOSIS — Z00.00 MEDICARE ANNUAL WELLNESS VISIT, SUBSEQUENT: Primary | ICD-10-CM

## 2021-07-07 DIAGNOSIS — M17.0 ARTHRITIS OF BOTH KNEES: ICD-10-CM

## 2021-07-07 DIAGNOSIS — Z85.3 HISTORY OF BREAST CANCER: ICD-10-CM

## 2021-07-07 DIAGNOSIS — I10 HTN (HYPERTENSION), MALIGNANT: ICD-10-CM

## 2021-07-07 DIAGNOSIS — R73.9 ELEVATED BLOOD SUGAR: ICD-10-CM

## 2021-07-07 DIAGNOSIS — F41.1 GENERALIZED ANXIETY DISORDER: ICD-10-CM

## 2021-07-07 DIAGNOSIS — E66.01 CLASS 2 SEVERE OBESITY DUE TO EXCESS CALORIES WITH SERIOUS COMORBIDITY AND BODY MASS INDEX (BMI) OF 35.0 TO 35.9 IN ADULT (HCC): ICD-10-CM

## 2021-07-07 PROBLEM — C50.912 BREAST CANCER, LEFT BREAST (HCC): Status: ACTIVE | Noted: 2018-04-25

## 2021-07-07 LAB
ALBUMIN SERPL-MCNC: 4.1 G/DL (ref 3.5–5.2)
ALBUMIN/GLOB SERPL: 1.6 G/DL
ALP SERPL-CCNC: 122 U/L (ref 39–117)
ALT SERPL W P-5'-P-CCNC: 12 U/L (ref 1–33)
ANION GAP SERPL CALCULATED.3IONS-SCNC: 8.1 MMOL/L (ref 5–15)
AST SERPL-CCNC: 14 U/L (ref 1–32)
BILIRUB SERPL-MCNC: 0.8 MG/DL (ref 0–1.2)
BUN SERPL-MCNC: 16 MG/DL (ref 8–23)
BUN/CREAT SERPL: 18.8 (ref 7–25)
CALCIUM SPEC-SCNC: 8.8 MG/DL (ref 8.6–10.5)
CHLORIDE SERPL-SCNC: 105 MMOL/L (ref 98–107)
CHOLEST SERPL-MCNC: 168 MG/DL (ref 0–200)
CO2 SERPL-SCNC: 27.9 MMOL/L (ref 22–29)
CREAT SERPL-MCNC: 0.85 MG/DL (ref 0.57–1)
GFR SERPL CREATININE-BSD FRML MDRD: 65 ML/MIN/1.73
GLOBULIN UR ELPH-MCNC: 2.6 GM/DL
GLUCOSE SERPL-MCNC: 142 MG/DL (ref 65–99)
HBA1C MFR BLD: 5.9 % (ref 4.8–5.6)
HDLC SERPL-MCNC: 39 MG/DL (ref 40–60)
LDLC SERPL CALC-MCNC: 109 MG/DL (ref 0–100)
LDLC/HDLC SERPL: 2.75 {RATIO}
POTASSIUM SERPL-SCNC: 3.7 MMOL/L (ref 3.5–5.2)
PROT SERPL-MCNC: 6.7 G/DL (ref 6–8.5)
SODIUM SERPL-SCNC: 141 MMOL/L (ref 136–145)
TRIGL SERPL-MCNC: 109 MG/DL (ref 0–150)
TSH SERPL DL<=0.05 MIU/L-ACNC: 2.19 UIU/ML (ref 0.27–4.2)
VLDLC SERPL-MCNC: 20 MG/DL (ref 5–40)

## 2021-07-07 PROCEDURE — 80053 COMPREHEN METABOLIC PANEL: CPT | Performed by: NURSE PRACTITIONER

## 2021-07-07 PROCEDURE — G0439 PPPS, SUBSEQ VISIT: HCPCS | Performed by: NURSE PRACTITIONER

## 2021-07-07 PROCEDURE — 1159F MED LIST DOCD IN RCRD: CPT | Performed by: NURSE PRACTITIONER

## 2021-07-07 PROCEDURE — 99214 OFFICE O/P EST MOD 30 MIN: CPT | Performed by: NURSE PRACTITIONER

## 2021-07-07 PROCEDURE — 83036 HEMOGLOBIN GLYCOSYLATED A1C: CPT | Performed by: NURSE PRACTITIONER

## 2021-07-07 PROCEDURE — 36415 COLL VENOUS BLD VENIPUNCTURE: CPT | Performed by: NURSE PRACTITIONER

## 2021-07-07 PROCEDURE — 80061 LIPID PANEL: CPT | Performed by: NURSE PRACTITIONER

## 2021-07-07 PROCEDURE — 84443 ASSAY THYROID STIM HORMONE: CPT | Performed by: NURSE PRACTITIONER

## 2021-07-07 PROCEDURE — 96160 PT-FOCUSED HLTH RISK ASSMT: CPT | Performed by: NURSE PRACTITIONER

## 2021-07-07 RX ORDER — BUSPIRONE HYDROCHLORIDE 15 MG/1
15 TABLET ORAL 2 TIMES DAILY
Qty: 180 TABLET | Refills: 3 | Status: SHIPPED | OUTPATIENT
Start: 2021-07-07 | End: 2022-04-05 | Stop reason: SDUPTHER

## 2021-07-07 RX ORDER — FAMOTIDINE 20 MG/1
20 TABLET, FILM COATED ORAL 2 TIMES DAILY
Qty: 180 TABLET | Refills: 3 | Status: SHIPPED | OUTPATIENT
Start: 2021-07-07 | End: 2021-09-21 | Stop reason: SDUPTHER

## 2021-07-07 RX ORDER — CLONIDINE HYDROCHLORIDE 0.2 MG/1
0.2 TABLET ORAL 2 TIMES DAILY
Qty: 180 TABLET | Refills: 1 | Status: SHIPPED | OUTPATIENT
Start: 2021-07-07 | End: 2021-09-21 | Stop reason: SDUPTHER

## 2021-07-07 NOTE — PATIENT INSTRUCTIONS
Reviewed prev PCP records, Medicare wellness today, reviewed [facility] records, labs and imaging, refill chronic dz meds, check BP at home, encourage overdue FU with oncology and preventative screenings, continue FU with GI, parking permit form signed, Enc healthy diet for weight loss and BS control  Advance Care Planning and Advance Directives     You make decisions on a daily basis - decisions about where you want to live, your career, your home, your life. Perhaps one of the most important decisions you face is your choice for future medical care. Take time to talk with your family and your healthcare team and start planning today.  Advance Care Planning is a process that can help you:  · Understand possible future healthcare decisions in light of your own experiences  · Reflect on those decision in light of your goals and values  · Discuss your decisions with those closest to you and the healthcare professionals that care for you  · Make a plan by creating a document that reflects your wishes    Surrogate Decision Maker  In the event of a medical emergency, which has left you unable to communicate or to make your own decisions, you would need someone to make decisions for you.  It is important to discuss your preferences for medical treatment with this person while you are in good health.     Qualities of a surrogate decision maker:  • Willing to take on this role and responsibility  • Knows what you want for future medical care  • Willing to follow your wishes even if they don't agree with them  • Able to make difficult medical decisions under stressful circumstances    Advance Directives  These are legal documents you can create that will guide your healthcare team and decision maker(s) when needed. These documents can be stored in the electronic medical record.    · Living Will - a legal document to guide your care if you have a terminal condition or a serious illness and are unable to communicate. States  vary by statute in document names/types, but most forms may include one or more of the following:        -  Directions regarding life-prolonging treatments        -  Directions regarding artificially provided nutrition/hydration        -  Choosing a healthcare decision maker        -  Direction regarding organ/tissue donation    · Durable Power of  for Healthcare - this document names an -in-fact to make medical decisions for you, but it may also allow this person to make personal and financial decisions for you. Please seek the advice of an  if you need this type of document.    **Advance Directives are not required and no one may discriminate against you if you do not sign one.    Medical Orders  Many states allow specific forms/orders signed by your physician to record your wishes for medical treatment in your current state of health. This form, signed in personal communication with your physician, addresses resuscitation and other medical interventions that you may or may not want.      For more information or to schedule a time with a [facility] Advance Care Planning Facilitator contact: [employer]/[facility] or call [phone] and someone will contact you directly.    Medicare Wellness  Personal Prevention Plan of Service     Date of Office Visit:  2021  Encounter Provider:  TENZIN Baires  Place of Service:  [facility]  Patient Name: Jeanie Tristan  :  1945    As part of the Medicare Wellness portion of your visit today, we are providing you with this personalized preventive plan of services (PPPS). This plan is based upon recommendations of the [place] Preventive Services Task Force (USPSTF) and the Advisory Committee on Immunization Practices (ACIP).    This lists the preventive care services that should be considered, and provides dates of when you are due. Items listed as completed are up-to-date and do not require  any further intervention.    Health Maintenance   Topic Date Due   • DXA SCAN  Never done   • COLORECTAL CANCER SCREENING  Never done   • COVID-19 Vaccine (1) Never done   • TDAP/TD VACCINES (1 - Tdap) Never done   • Pneumococcal Vaccine 65+ (1 of 1 - PPSV23) Never done   • ZOSTER VACCINE (2 of 2) 02/26/2015   • MAMMOGRAM  04/05/2020   • INFLUENZA VACCINE  08/01/2021   • ANNUAL WELLNESS VISIT  07/07/2022   • HEPATITIS C SCREENING  Completed       Orders Placed This Encounter   Procedures   • Lipid Panel   • TSH     Order Specific Question:   Release to patient     Answer:   Immediate   • Comprehensive Metabolic Panel     Order Specific Question:   Release to patient     Answer:   Immediate   • Hemoglobin A1c     Order Specific Question:   Release to patient     Answer:   Immediate       No follow-ups on file.

## 2021-07-07 NOTE — PROGRESS NOTES
"Answers for HPI/ROS submitted by the patient on 7/6/2021  Please describe your symptoms.: Wellness checkup. Haven't been in office due to pandemic. Need refills on blood pressure & antiacid meds.  Recently diagnosed with H. Pylori.  Have you had these symptoms before?: No  How long have you been having these symptoms?: Greater than 2 weeks  Please list any medications you are currently taking for this condition.: Pepto Bismol  Please describe any probable cause for these symptoms. : Not known  What is the primary reason for your visit?: Other    Chief Complaint  Establish Care bti pcp    Sapna Tristan presents to [facility]  History of Present Illness  Here to [facility], prev PCP Dr Galicia retired new to me today, Recently went to [facility] 05/21 for persistent nausea and sees GI Dr Cardozo and had FU with TENZIN Bueno dx H Pylori plans to start abx, With chronic gerd on famotidine 20 mg request refill, labs in ER elevated BS  With chronic HTN on clonidine 0.2 mg BID checks BP at home runs well controlled states has white coat syndrome, no CP dizziness HA but B LE edema attributes to sedentary retired watching TV, doesn't want diuretic plans to elevate  With hx of breast ca dx 03/18 sees [facility] oncology Dr Siddiqi on anastrozole 1 mg overdue mammo and dexa  With generalized anxiety well controlled on buspar 20 mg BID   With B knee arthritis using cane using tylenol, doesn't want to see ortho at this time, request updated parking permit    Objective   Vital Signs:   BP (!) 182/98   Pulse 100   Temp 97.5 °F (36.4 °C)   Ht 162.6 cm (64\")   Wt 94.3 kg (207 lb 12.8 oz)   SpO2 96%   BMI 35.67 kg/m²     Physical Exam  Vitals reviewed.   Constitutional:       Appearance: She is well-developed.   HENT:      Head: Normocephalic and atraumatic.   Eyes:      Conjunctiva/sclera: Conjunctivae normal.      Pupils: Pupils are equal, round, and reactive to light. "   Cardiovascular:      Rate and Rhythm: Normal rate and regular rhythm.      Pulses: Normal pulses.      Heart sounds: Normal heart sounds.   Pulmonary:      Effort: Pulmonary effort is normal.      Breath sounds: Normal breath sounds.   Abdominal:      General: There is no distension.      Palpations: There is no mass.      Tenderness: There is no abdominal tenderness. There is no right CVA tenderness, left CVA tenderness, guarding or rebound.      Hernia: No hernia is present.   Musculoskeletal:         General: Tenderness (B knee stiff ROM using cane gait affected) present.      Cervical back: Normal range of motion.      Right lower leg: No edema.      Left lower leg: No edema.   Skin:     General: Skin is warm and dry.   Neurological:      Mental Status: She is alert and oriented to person, place, and time.   Psychiatric:         Mood and Affect: Mood normal.         Behavior: Behavior normal.         Thought Content: Thought content normal.         Judgment: Judgment normal.        Result Review :                 Assessment and Plan    Diagnoses and all orders for this visit:    1. Medicare annual wellness visit, subsequent (Primary)    2. Arthritis of both knees    3. Generalized anxiety disorder    4. History of breast cancer    5. HTN (hypertension), malignant  -     Lipid Panel  -     TSH  -     Comprehensive Metabolic Panel    6. Gastroesophageal reflux disease, unspecified whether esophagitis present    7. Class 2 severe obesity due to excess calories with serious comorbidity and body mass index (BMI) of 35.0 to 35.9 in adult (CMS/HCC)    8. Elevated blood sugar  -     Hemoglobin A1c    Other orders  -     cloNIDine (CATAPRES) 0.2 MG tablet; Take 1 tablet by mouth 2 (Two) Times a Day.  Dispense: 180 tablet; Refill: 1  -     busPIRone (BUSPAR) 15 MG tablet; Take 1 tablet by mouth 2 (Two) Times a Day.  Dispense: 180 tablet; Refill: 3  -     famotidine (PEPCID) 20 MG tablet; Take 1 tablet by mouth 2 (Two)  Times a Day.  Dispense: 180 tablet; Refill: 3        Follow Up   No follow-ups on file.  Patient was given instructions and counseling regarding her condition or for health maintenance advice. Please see specific information pulled into the AVS if appropriate.   Reviewed [facility] PCP records, Medicare wellness today, reviewed [facility] records, labs and imaging, refill chronic dz meds, check BP at home, encourage overdue FU with oncology and preventative screenings, continue FU with GI, parking permit form signed, Enc healthy diet for weight loss and BS control

## 2021-07-07 NOTE — PROGRESS NOTES
The ABCs of the Annual Wellness Visit  Subsequent Medicare Wellness Visit    Chief Complaint   Patient presents with   • Establish Care bti pcp     Subjective   History of Present Illness:  Jeanie Tristan is a 76 y.o. female who presents for a Subsequent Medicare Wellness Visit.    HEALTH RISK ASSESSMENT    Recent Hospitalizations:  Recently treated at the following:  Other: [facility] 05[facility]/21-05/[facility]/21    Current Medical Providers:  Patient Care Team:  Kaela Ch APRN as PCP - General (Family Medicine)  Sha Siddiqi MD as Consulting Physician (Hematology and Oncology)  Sabina Cardozo MD (Gastroenterology)    Smoking Status:  Social History     Tobacco Use   Smoking Status Former Smoker   Smokeless Tobacco Never Used     Alcohol Consumption:  Social History     Substance and Sexual Activity   Alcohol Use No     Depression Screen:   PHQ-2/PHQ-9 Depression Screening 7/7/2021   Little interest or pleasure in doing things 0   Feeling down, depressed, or hopeless 0   Trouble falling or staying asleep, or sleeping too much 0   Feeling tired or having little energy 0   Poor appetite or overeating 0   Feeling bad about yourself - or that you are a failure or have let yourself or your family down 0   Trouble concentrating on things, such as reading the newspaper or watching television 0   Moving or speaking so slowly that other people could have noticed. Or the opposite - being so fidgety or restless that you have been moving around a lot more than usual 0   Thoughts that you would be better off dead, or of hurting yourself in some way 0   Total Score 0   If you checked off any problems, how difficult have these problems made it for you to do your work, take care of things at home, or get along with other people? Not difficult at all     Fall Risk Screen:  STEADI Fall Risk Assessment was completed, and patient is at LOW risk for falls.Assessment completed on:7/7/2021    Health Habits and Functional and  Cognitive Screening:  Functional & Cognitive Status 7/7/2021   Do you have difficulty preparing food and eating? No   Do you have difficulty bathing yourself, getting dressed or grooming yourself? No   Do you have difficulty using the toilet? No   Do you have difficulty moving around from place to place? Yes   Do you have trouble with steps or getting out of a bed or a chair? No   Current Diet Well Balanced Diet   Dental Exam Not up to date   Eye Exam Not up to date   Exercise (times per week) 7 times per week   Current Exercises Include Walking   Current Exercise Activities Include -   Do you need help using the phone?  No   Are you deaf or do you have serious difficulty hearing?  No   Do you need help with transportation? No   Do you need help shopping? No   Do you need help preparing meals?  No   Do you need help with housework?  No   Do you need help with laundry? No   Do you need help taking your medications? No   Do you need help managing money? No   Do you ever drive or ride in a car without wearing a seat belt? No   Have you felt unusual stress, anger or loneliness in the last month? No   Who do you live with? Child   If you need help, do you have trouble finding someone available to you? No   Have you been bothered in the last four weeks by sexual problems? No   Do you have difficulty concentrating, remembering or making decisions? No     Does the patient have evidence of cognitive impairment? No    Asprin use counseling:Does not need ASA (and currently is not on it)    Age-appropriate Screening Schedule:  Refer to the list below for future screening recommendations based on patient's age, sex and/or medical conditions. Orders for these recommended tests are listed in the plan section. The patient has been provided with a written plan.    Health Maintenance   Topic Date Due   • DXA SCAN  Never done   • TDAP/TD VACCINES (1 - Tdap) Never done   • ZOSTER VACCINE (2 of 2) 02/26/2015   • MAMMOGRAM  04/05/2020    • INFLUENZA VACCINE  08/01/2021          The following portions of the patient's history were reviewed and updated as appropriate: allergies, current medications, past family history, past medical history, past social history, past surgical history and problem list.    Outpatient Medications Prior to Visit   Medication Sig Dispense Refill   • acetaminophen (TYLENOL) 500 MG tablet Take 500 mg by mouth Every 6 (Six) Hours As Needed for Mild Pain .     • anastrozole (ARIMIDEX) 1 MG tablet Take 1 mg by mouth.     • calcium carbonate (TUMS) 500 MG chewable tablet      • Calcium Carbonate-Vitamin D (OSCAL-500) 500-400 MG-UNIT per tablet Take  by mouth.     • triamcinolone (KENALOG) 0.1 % cream Apply  topically 2 (Two) Times a Day. 28.4 g 2   • vitamin B-12 (CYANOCOBALAMIN) 100 MCG tablet      • busPIRone (BUSPAR) 15 MG tablet TAKE 1 TABLET BY MOUTH TWICE A  tablet 3   • cloNIDine (CATAPRES) 0.2 MG tablet Take 1 tablet by mouth 2 (Two) Times a Day. 14 tablet 0   • famotidine (PEPCID) 20 MG tablet TAKE 1 TABLET BY MOUTH TWICE DAILY 60 tablet 0   • naproxen sodium (ALEVE) 220 MG tablet Take 220 mg by mouth 2 (Two) Times a Day As Needed for Mild Pain (1-3).     • raNITIdine (ZANTAC) 150 MG tablet take 1 tablet by mouth twice a day 60 tablet 8     No facility-administered medications prior to visit.       Patient Active Problem List   Diagnosis   • GERD (gastroesophageal reflux disease)   • HTN (hypertension), malignant   • Breast cancer, left breast (CMS/HCC)   • Obesity (BMI 30-39.9)   • Primary osteoarthritis of both knees   • Generalized anxiety disorder       Advanced Care Planning:  ACP discussion was held with the patient during this visit. Patient has an advance directive (not in EMR), copy requested.    Review of Systems    Compared to one year ago, the patient feels her physical health is better. Except for h pylori more recently  Compared to one year ago, the patient feels her mental health is better.  "Increased buspar over the last year    Reviewed chart for potential of high risk medication in the elderly: yes  Reviewed chart for potential of harmful drug interactions in the elderly:yes    Objective         Vitals:    07/07/21 1449   BP: (!) 182/98   Pulse: 100   Temp: 97.5 °F (36.4 °C)   SpO2: 96%   Weight: 94.3 kg (207 lb 12.8 oz)   Height: 162.6 cm (64\")       Body mass index is 35.67 kg/m².  Discussed the patient's BMI with her. The BMI is above average; BMI management plan is completed.    Physical Exam    Lab Results   Component Value Date    TRIG 109 07/07/2021    HDL 39 (L) 07/07/2021     (H) 07/07/2021    VLDL 20 07/07/2021    HGBA1C 5.90 (H) 07/07/2021        Assessment/Plan   Medicare Risks and Personalized Health Plan  CMS Preventative Services Quick Reference  Cardiovascular risk  Obesity/Overweight   FU with GI for colonoscopy, FU with oncology for mammo and dexa    The above risks/problems have been discussed with the patient.  Pertinent information has been shared with the patient in the After Visit Summary.  Follow up plans and orders are seen below in the Assessment/Plan Section.    Diagnoses and all orders for this visit:    1. Medicare annual wellness visit, subsequent (Primary)    2. Arthritis of both knees    3. Generalized anxiety disorder    4. History of breast cancer    5. HTN (hypertension), malignant  -     Lipid Panel  -     TSH  -     Comprehensive Metabolic Panel    6. Gastroesophageal reflux disease, unspecified whether esophagitis present    7. Class 2 severe obesity due to excess calories with serious comorbidity and body mass index (BMI) of 35.0 to 35.9 in adult (CMS/[facility])    8. Elevated blood sugar  -     Hemoglobin A1c    Other orders  -     cloNIDine (CATAPRES) 0.2 MG tablet; Take 1 tablet by mouth 2 (Two) Times a Day.  Dispense: 180 tablet; Refill: 1  -     busPIRone (BUSPAR) 15 MG tablet; Take 1 tablet by mouth 2 (Two) Times a Day.  Dispense: 180 tablet; Refill: 3  - "     famotidine (PEPCID) 20 MG tablet; Take 1 tablet by mouth 2 (Two) Times a Day.  Dispense: 180 tablet; Refill: 3      Follow Up:  No follow-ups on file.     An After Visit Summary and PPPS were given to the patient.               Answers for HPI/ROS submitted by the patient on 7/6/2021  Please describe your symptoms.: Wellness checkup. Haven't been in office due to pandemic. Need refills on blood pressure & antiacid meds.  Recently diagnosed with H. Pylori.  Have you had these symptoms before?: No  How long have you been having these symptoms?: Greater than 2 weeks  Please list any medications you are currently taking for this condition.: Pepto Bismol  Please describe any probable cause for these symptoms. : Not known  What is the primary reason for your visit?: Other

## 2021-08-27 ENCOUNTER — TELEPHONE (OUTPATIENT)
Dept: FAMILY MEDICINE CLINIC | Facility: CLINIC | Age: 76
End: 2021-08-27

## 2021-08-27 NOTE — TELEPHONE ENCOUNTER
"Caller: Jeanie Tristan    Relationship: Self    Best call back number: [phone]     What is the best time to reach you: ANY TIME    Who are you requesting to speak with (clinical staff, provider,  specific staff member): CLINICAL    What was the call regarding: PATIENT CALLED REGARDING HER INFLAMMED AND BLEEDING HEMORRHOIDS. SHE SAID SHE HAS HAD A PROLAPSED BOWEL FOR MANY YEARS. ANY TIME SHE HAS A BOWEL MOVEMENT, IT GOES INTO A POUCH AND SHE HAS TO GET IT OUT HERSELF. SHE IS UNABLE TO DO THAT CURRENTLY BECAUSE THE HEMORRHOIDS ARE SO BAD. SHE HAS BEEN TO THE HOSPITAL REGARDING THIS ([facility]) BUT THEY SENT HER BACK HOME. SHE WAS TOLD THE SURGEON WAS NOT ABLE TO DO ANYTHING FOR HER UNLESS SHE WAS HIS PATIENT, AND THAT SHE NEEDS TO TALK TO HER PRIMARY CARE PROVIDER FIRST. PATIENT DID NOT WANT TO GO TO THE HOSPITAL AGAIN BECAUSE SHE WAS CONCERNED THEY WOULD JUST SEND HER BACK HOME. SHE IS IN A LOT OF PAIN AND NOTED THAT SHE'S ALSO DRY HEAVING.   PATIENT WANTED TO INFORM MAGIGE RAO OF THIS AND KNOW WHAT SHE CAN DO.  PATIENT'S EXACT WORDS WERE \"I'M AFRAID I'M GOING TO DIE\".    I ATTEMPTED TO WARM TRANSFER TO NURSE TRIAGE TWICE AND WAS UNSUCCESSFUL, BUT I SUCCESSFULLY WARM TRANSFERRED PATIENT TO THE FRONT OFFICE.     PATIENT STILL WANTED MAGGIE TO KNOW WHAT'S GOING ON.        "

## 2021-08-30 NOTE — TELEPHONE ENCOUNTER
I have only seen this patient once in July, transfer from Dr Galicia. Does she need GI referral? Please offer apt as looks like she had abnormal CT imaging with pulm nodule

## 2021-09-07 ENCOUNTER — OFFICE VISIT (OUTPATIENT)
Dept: FAMILY MEDICINE CLINIC | Facility: CLINIC | Age: 76
End: 2021-09-07

## 2021-09-07 DIAGNOSIS — R91.1 PULMONARY NODULE: ICD-10-CM

## 2021-09-07 DIAGNOSIS — R63.4 WEIGHT LOSS: ICD-10-CM

## 2021-09-07 DIAGNOSIS — R63.0 POOR APPETITE: ICD-10-CM

## 2021-09-07 DIAGNOSIS — R19.8 ALTERNATING CONSTIPATION AND DIARRHEA: Primary | ICD-10-CM

## 2021-09-07 DIAGNOSIS — R53.1 WEAKNESS: ICD-10-CM

## 2021-09-07 PROCEDURE — 99442 PR PHYS/QHP TELEPHONE EVALUATION 11-20 MIN: CPT | Performed by: NURSE PRACTITIONER

## 2021-09-07 NOTE — PATIENT INSTRUCTIONS
You have chosen to receive care through a telephone visit. Do you consent to use a telephone visit for your medical care today? Yes spent 15 min on phone with patient, reviewed ER records from last month with imaging and labs, instructed her to call GI Dr Cardozo to discuss hemorrhoids and GI upset, continue using topical hemorrhoid and if persist or worsen go to ER, recommend gatorade if remains weak concern about dehydration, order CT chest due 3-6 mo

## 2021-09-07 NOTE — PROGRESS NOTES
Chief Complaint  Hemorrhoids and Weight Loss    Subjective          Jeanie Tristan presents to [facility]  History of Present Illness  Here to FU on recent [facility] ER visits 08/21/21 and 08/27/21 for hemorrhoids, with hx of bowel prolapse and incomplete emptying > 4 weeks and has to remove stool herself, with poor appetite, states only able to eat small meals and fluids and weight loss 35 lbs since last month, with hx of h pylori recently dx GI Dr Cardozo and had FU with TENZIN Bueno wasn't able to complete all antibiotics, With chronic gerd on famotidine 20 mg, with ER visit 08/21/21 dx external and internal hemorrhoids and dc on colace, miralax and anusol HC topical, went back as hadn't had bowel movement x 1 week and CT abd pelvis showing mild stool burdern and RLL pulm nodule recommend CT chest 3-6 mo, defer enema and went home, last had loose stool but well formed 09/05/21 denies pain or bleeding, no longer using colace or miralax but using medicated hemorrhoid pads    Objective   Vital Signs:   There were no vitals taken for this visit.    Physical Exam  Vitals reviewed.   Constitutional:       Appearance: She is well-developed.   Pulmonary:      Effort: Pulmonary effort is normal.      Breath sounds: Normal breath sounds.   Neurological:      Mental Status: She is alert and oriented to person, place, and time.   Psychiatric:         Mood and Affect: Mood normal.         Behavior: Behavior normal.         Thought Content: Thought content normal.         Judgment: Judgment normal.        Result Review :                 Assessment and Plan    Diagnoses and all orders for this visit:    1. Alternating constipation and diarrhea (Primary)    2. Weight loss    3. Poor appetite    4. Weakness    5. Pulmonary nodule  -     CT Chest Without Contrast Diagnostic; Future      I spent 17 minutes caring for Jeanie on this date of service. This time includes time spent by me in the  following activities:preparing for the visit, reviewing tests, obtaining and/or reviewing a separately obtained history, performing a medically appropriate examination and/or evaluation , counseling and educating the patient/family/caregiver, ordering medications, tests, or procedures and documenting information in the medical record  Follow Up {Instructions Charge Capture  Follow-up Communications :23}  No follow-ups on file.  Patient was given instructions and counseling regarding her condition or for health maintenance advice. Please see specific information pulled into the AVS if appropriate.     You have chosen to receive care through a telephone visit. Do you consent to use a telephone visit for your medical care today? Yes spent 17 min on phone with patient, reviewed ER records from last month with imaging and labs, instructed her to call GI Dr Cardozo to discuss hemorrhoids and GI upset, continue using topical hemorrhoid and if persist or worsen go to ER, recommend gatorade if remains weak concern about dehydration, order CT chest due 3-6 mo

## 2021-09-13 ENCOUNTER — TELEPHONE (OUTPATIENT)
Dept: FAMILY MEDICINE CLINIC | Facility: CLINIC | Age: 76
End: 2021-09-13

## 2021-09-13 NOTE — TELEPHONE ENCOUNTER
Patient's cousin Mara Andrew called on call provider 09/12/21 5:20 PM concerned about patient who is very weak and went to [facility] but didn't keep her. Explained I don't have [facility] records to review and last saw patient last week via telemedicine and recommended she FU with GI Dr Cardozo. Instructed her to go to ER if she is concerned about weakness and dehydration    Please call and check on patient today

## 2021-09-20 ENCOUNTER — HOME HEALTH ADMISSION (OUTPATIENT)
Dept: HOME HEALTH SERVICES | Facility: HOME HEALTHCARE | Age: 76
End: 2021-09-20

## 2021-09-20 ENCOUNTER — TELEPHONE (OUTPATIENT)
Dept: FAMILY MEDICINE CLINIC | Facility: CLINIC | Age: 76
End: 2021-09-20

## 2021-09-20 DIAGNOSIS — R63.0 POOR APPETITE: ICD-10-CM

## 2021-09-20 DIAGNOSIS — K62.3 RECTAL PROLAPSE: ICD-10-CM

## 2021-09-20 DIAGNOSIS — R63.4 WEIGHT LOSS: ICD-10-CM

## 2021-09-20 DIAGNOSIS — R62.7 FAILURE TO THRIVE IN ADULT: ICD-10-CM

## 2021-09-20 DIAGNOSIS — R53.1 GENERAL WEAKNESS: Primary | ICD-10-CM

## 2021-09-20 NOTE — TELEPHONE ENCOUNTER
PATIENTS NIECE CALLING STATING THE PATIENT WAS RELEASED FROM [facility] ON 09/18 SHE STATED SHE IS STILL NOT DOING GOOD AND WOULD LIKE TO KNOW WHAT SHE SHOULD DO SHE IS STILL VERY WEAK AND NOT WELL AT ALL SHE IS HOME ALONE SHE WOULD LIKE TO KNOW IF THERE IS AWAY TO HAVE HOME HEALTH TO HELP HER.    PLEASE ADVISE  [phone]

## 2021-09-20 NOTE — TELEPHONE ENCOUNTER
Placed referral, please fax    Happy to put in HH referral-is there a company she has used before and wants?

## 2021-09-20 NOTE — TELEPHONE ENCOUNTER
I spoke to Lorrie she said yes they would like home health to be set up for patient . Says patient is at home alone.

## 2021-09-21 RX ORDER — FAMOTIDINE 20 MG/1
20 TABLET, FILM COATED ORAL 2 TIMES DAILY
Qty: 180 TABLET | Refills: 3 | Status: SHIPPED | OUTPATIENT
Start: 2021-09-21 | End: 2022-04-05 | Stop reason: SDUPTHER

## 2021-09-21 RX ORDER — CLONIDINE HYDROCHLORIDE 0.2 MG/1
0.2 TABLET ORAL 2 TIMES DAILY
Qty: 180 TABLET | Refills: 1 | Status: SHIPPED | OUTPATIENT
Start: 2021-09-21 | End: 2022-03-30 | Stop reason: SDUPTHER

## 2021-09-21 NOTE — TELEPHONE ENCOUNTER
Caller: Jeanie Tristan    Relationship: Self      Medication requested (name and dosage): cloNIDine (CATAPRES) 0.2 MG tablet          famotidine (PEPCID) 20 MG tablet     Pharm[facility] where [facility] be sent: [id number] [address] AT [facility] & [facility] - [phone]  - [phone]   [phone]    Additional details provided by patient: PATIENT STATES SHE HAS BEEN IN THE HOSPITAL A WHILE AND IS ON A CERTAIN DIET THAT MAKES HER USE THE RESTROOM ON HERSELF SO SHE CAN'T COME IN FOR A WHILE BUT HER NEW PT APT WITH [facility] AVAILABLE IS DR. TREVIZO ON 11/11 HOWEVER PATIENT NEEDS REFILLS ON THESE TWO MEDS ASAP WITH ENOUGH TO LAST UNTIL HER APT IN November.     Best call back number: [phone]    Does the patient have less than a 3 day supply:  [x] Yes  [] No    Amairani Arciniega Rep   09/21/21 12:05 EDT

## 2021-10-05 ENCOUNTER — TELEPHONE (OUTPATIENT)
Dept: FAMILY MEDICINE CLINIC | Facility: CLINIC | Age: 76
End: 2021-10-05

## 2022-01-13 ENCOUNTER — OFFICE VISIT (OUTPATIENT)
Dept: FAMILY MEDICINE CLINIC | Facility: CLINIC | Age: 77
End: 2022-01-13

## 2022-01-13 VITALS
DIASTOLIC BLOOD PRESSURE: 92 MMHG | HEIGHT: 64 IN | OXYGEN SATURATION: 99 % | SYSTOLIC BLOOD PRESSURE: 142 MMHG | BODY MASS INDEX: 30.29 KG/M2 | TEMPERATURE: 97.8 F | HEART RATE: 92 BPM | WEIGHT: 177.4 LBS

## 2022-01-13 DIAGNOSIS — K21.9 GASTROESOPHAGEAL REFLUX DISEASE WITHOUT ESOPHAGITIS: ICD-10-CM

## 2022-01-13 DIAGNOSIS — Z78.0 POST-MENOPAUSAL: ICD-10-CM

## 2022-01-13 DIAGNOSIS — R11.0 MODERATE NAUSEA: ICD-10-CM

## 2022-01-13 DIAGNOSIS — I10 HTN (HYPERTENSION), MALIGNANT: ICD-10-CM

## 2022-01-13 DIAGNOSIS — R35.0 URINARY FREQUENCY: Primary | ICD-10-CM

## 2022-01-13 PROCEDURE — 90662 IIV NO PRSV INCREASED AG IM: CPT | Performed by: NURSE PRACTITIONER

## 2022-01-13 PROCEDURE — G0008 ADMIN INFLUENZA VIRUS VAC: HCPCS | Performed by: NURSE PRACTITIONER

## 2022-01-13 PROCEDURE — 99214 OFFICE O/P EST MOD 30 MIN: CPT | Performed by: NURSE PRACTITIONER

## 2022-01-13 RX ORDER — TRIAMCINOLONE ACETONIDE 1 MG/G
CREAM TOPICAL 2 TIMES DAILY
Qty: 28.4 G | Refills: 2 | Status: SHIPPED | OUTPATIENT
Start: 2022-01-13 | End: 2022-07-29

## 2022-01-13 RX ORDER — ONDANSETRON 4 MG/1
4 TABLET, FILM COATED ORAL EVERY 8 HOURS PRN
Qty: 30 TABLET | Refills: 0 | Status: SHIPPED | OUTPATIENT
Start: 2022-01-13 | End: 2022-04-01 | Stop reason: SDUPTHER

## 2022-01-13 NOTE — PATIENT INSTRUCTIONS
Constipation, Adult  Constipation is when a person has fewer than three bowel movements in a week, has difficulty having a bowel movement, or has stools (feces) that are dry, hard, or larger than normal. Constipation may be caused by an underlying condition. It may become worse with age if a person takes certain medicines and does not take in enough fluids.  Follow these instructions at home:  Eating and drinking    · Eat foods that have a lot of fiber, such as beans, whole grains, and fresh fruits and vegetables.  · Limit foods that are low in fiber and high in fat and processed sugars, such as fried or sweet foods. These include french fries, hamburgers, cookies, candies, and soda.  · Drink enough fluid to keep your urine pale yellow.    General instructions  · Exercise regularly or as told by your health care provider. Try to do 150 minutes of moderate exercise each week.  · Use the bathroom when you have the urge to go. Do not hold it in.  · Take over-the-counter and prescription medicines only as told by your health care provider. This includes any fiber supplements.  · During bowel movements:  ? Practice deep breathing while relaxing the lower abdomen.  ? Practice pelvic floor relaxation.  · Watch your condition for any changes. Let your health care provider know about them.  · Keep all follow-up visits as told by your health care provider. This is important.  Contact a health care provider if:  · You have pain that gets worse.  · You have a fever.  · You do not have a bowel movement after 4 days.  · You vomit.  · You are not hungry or you lose weight.  · You are bleeding from the opening between the buttocks (anus).  · You have thin, pencil-like stools.  Get help right away if:  · You have a fever and your symptoms suddenly get worse.  · You leak stool or have blood in your stool.  · Your abdomen is bloated.  · You have severe pain in your abdomen.  · You feel dizzy or you faint.  Summary  · Constipation is  when a person has fewer than three bowel movements in a week, has difficulty having a bowel movement, or has stools (feces) that are dry, hard, or larger than normal.  · Eat foods that have a lot of fiber, such as beans, whole grains, and fresh fruits and vegetables.  · Drink enough fluid to keep your urine pale yellow.  · Take over-the-counter and prescription medicines only as told by your health care provider. This includes any fiber supplements.  This information is not intended to replace advice given to you by your health care provider. Make sure you discuss any questions you have with your health care provider.  Document Revised: 11/04/2020 Document Reviewed: 11/04/2020  [employer] Patient Education © 2021 Elsevier Inc.  Ondansetron tablets  What is this medicine?  ONDANSETRON (on DAN se nadia) is used to treat nausea and vomiting caused by chemotherapy. It is also used to prevent or treat nausea and vomiting after surgery.  This medicine may be used for other purposes; ask your health care provider or pharmacist if you have questions.  COMMON BRAND NAME(S): Zofran  What should I tell my health care provider before I take this medicine?  They need to know if you have any of these conditions:  · heart disease  · history of irregular heartbeat  · liver disease  · low levels of magnesium or potassium in the blood  · an unusual or allergic reaction to ondansetron, granisetron, other medicines, foods, dyes, or preservatives  · pregnant or trying to get pregnant  · breast-feeding  How should I use this medicine?  Take this medicine by mouth with a glass of water. Follow the directions on your prescription label. Take your doses at regular intervals. Do not take your medicine more often than directed.  Talk to your pediatrician regarding the use of this medicine in children. Special care may be needed.  Overdosage: If you think you have taken too much of this medicine contact a poison control center or emergency room  at once.  NOTE: This medicine is only for you. Do not share this medicine with others.  What if I miss a dose?  If you miss a dose, take it as soon as you can. If it is almost time for your next dose, take only that dose. Do not take double or extra doses.  What may interact with this medicine?  Do not take this medicine with any of the following medications:  · apomorphine  · certain medicines for fungal infections like fluconazole, itraconazole, ketoconazole, posaconazole, voriconazole  · cisapride  · dronedarone  · pimozide  · thioridazine  This medicine may also interact with the following medications:  · carbamazepine  · certain medicines for depression, anxiety, or psychotic disturbances  · fentanyl  · linezolid  · MAOIs like Carbex, Eldepryl, Marplan, Nardil, and Parnate  · methylene blue (injected into a vein)  · other medicines that prolong the QT interval (cause an abnormal heart rhythm) like dofetilide, ziprasidone  · phenytoin  · rifampicin  · tramadol  This list may not describe all possible interactions. Give your health care provider a list of all the medicines, herbs, non-prescription drugs, or dietary supplements you use. Also tell them if you smoke, drink alcohol, or use illegal drugs. Some items may interact with your medicine.  What should I watch for while using this medicine?  Check with your doctor or health care professional right away if you have any sign of an allergic reaction.  What side effects may I notice from receiving this medicine?  Side effects that you should report to your doctor or health care professional as soon as possible:  · allergic reactions like skin rash, itching or hives, swelling of the face, lips or tongue  · breathing problems  · confusion  · dizziness  · fast or irregular heartbeat  · feeling faint or lightheaded, falls  · fever and chills  · loss of balance or coordination  · seizures  · sweating  · swelling of the hands or feet  · tightness in the  chest  · tremors  · unusually weak or tired  Side effects that usually do not require medical attention (report to your doctor or health care professional if they continue or are bothersome):  · constipation or diarrhea  · headache  This list may not describe all possible side effects. Call your doctor for medical advice about side effects. You may report side effects to FDA at 5-346-ZSB-8508.  Where should I keep my medicine?  Keep out of the reach of children.  Store between 2 and 30 degrees C (36 and 86 degrees F). Throw away any unused medicine after the expiration date.  NOTE: This sheet is a summary. It may not cover all possible information. If you have questions about this medicine, talk to your doctor, pharmacist, or health care provider.  © 2021 Elsevier/Gold Standard (2019-12-10 07:16:43)  Urinary Frequency, Adult  Urinary frequency means urinating more often than usual. You may urinate every 1-2 hours even though you drink a normal amount of fluid and do not have a bladder infection or condition. Although you urinate more often than normal, the total amount of urine produced in a day is normal.  With urinary frequency, you may have an urgent need to urinate often. The stress and anxiety of needing to find a bathroom quickly can make this urge worse. This condition may go away on its own or you may need treatment at home. Home treatment may include bladder training, exercises, taking medicines, or making changes to your diet.  Follow these instructions at home:  Bladder health    · Keep a bladder diary if told by your health care provider. Keep track of:  ? What you eat and drink.  ? How often you urinate.  ? How much you urinate.  · Follow a bladder training program if told by your health care provider. This may include:  ? Learning to delay going to the bathroom.  ? Double urinating (voiding). This helps if you are not completely emptying your bladder.  ? Scheduled voiding.  · Do Kegel exercises as told  by your health care provider. Kegel exercises strengthen the muscles that help control urination, which may help the condition.    Eating and drinking  · If told by your health care provider, make diet changes, such as:  ? Avoiding caffeine.  ? Drinking fewer fluids, especially alcohol.  ? Not drinking in the evening.  ? Avoiding foods or drinks that may irritate the bladder. These include coffee, tea, soda, artificial sweeteners, citrus, tomato-based foods, and chocolate.  ? Eating foods that help prevent or ease constipation. Constipation can make this condition worse. Your health care provider may recommend that you:  § Drink enough fluid to keep your urine pale yellow.  § Take over-the-counter or prescription medicines.  § Eat foods that are high in fiber, such as beans, whole grains, and fresh fruits and vegetables.  § Limit foods that are high in fat and processed sugars, such as fried or sweet foods.  General instructions  · Take over-the-counter and prescription medicines only as told by your health care provider.  · Keep all follow-up visits as told by your health care provider. This is important.  Contact a health care provider if:  · You start urinating more often.  · You feel pain or irritation when you urinate.  · You notice blood in your urine.  · Your urine looks cloudy.  · You develop a fever.  · You begin vomiting.  Get help right away if:  · You are unable to urinate.  Summary  · Urinary frequency means urinating more often than usual. With urinary frequency, you may urinate every 1-2 hours even though you drink a normal amount of fluid and do not have a bladder infection or other bladder condition.  · Your health care provider may recommend that you keep a bladder diary, follow a bladder training program, or make dietary changes.  · If told by your health care provider, do Kegel exercises to strengthen the muscles that help control urination.  · Take over-the-counter and prescription medicines  only as told by your health care provider.  · Contact a health care provider if your symptoms do not improve or get worse.  This information is not intended to replace advice given to you by your health care provider. Make sure you discuss any questions you have with your health care provider.  Document Revised: 06/27/2019 Document Reviewed: 06/27/2019  [employer] Patient Education © 2021 Elsevier Inc.

## 2022-01-13 NOTE — PROGRESS NOTES
"Chief Complaint  Establish Care and Possible UTI    Subjective          Jeanie Tristan presents to [facility]  History of Present Illness  76-year-old female, former [facility] patient, new to me, presenting to establish care with complaints of urinary frequency, denies burning with urination or difficulty urinating, will rule out UTI (lab gone for the day, pt to come back tomorrow for UA).  With HTN, takes clonidine 0.2 mg BID. With GERD, takes Pepcid 20 mg BID, reports intermittent nausea, she has used Zofran PRN in the past with good results. Last , Trigly 109, HDL 39,  on 7/7/21. UTD on colonoscopy and wellness visit, mammogram pending, ambulation with use of quad cane. Denies SOA, CP, HA, dizziness, LE edema.     Objective   Vital Signs:   /92 (BP Location: Left arm, Patient Position: Sitting, Cuff Size: Adult)   Pulse 92   Temp 97.8 °F (36.6 °C) (Infrared)   Ht 162.6 cm (64.02\")   Wt 80.5 kg (177 lb 6.4 oz)   SpO2 99%   BMI 30.44 kg/m²     Physical Exam  Vitals and nursing note reviewed.   Constitutional:       Appearance: Normal appearance.   HENT:      Head: Normocephalic.      Right Ear: Tympanic membrane normal.      Left Ear: Tympanic membrane normal.      Nose: Nose normal.      Mouth/Throat:      Pharynx: Oropharynx is clear.   Eyes:      Extraocular Movements: Extraocular movements intact.      Conjunctiva/sclera: Conjunctivae normal.      Pupils: Pupils are equal, round, and reactive to light.   Cardiovascular:      Rate and Rhythm: Normal rate and regular rhythm.      Pulses: Normal pulses.      Heart sounds: Normal heart sounds.   Pulmonary:      Effort: Pulmonary effort is normal.      Breath sounds: Normal breath sounds.   Abdominal:      General: Bowel sounds are normal.      Palpations: Abdomen is soft.   Musculoskeletal:         General: Normal range of motion.      Cervical back: Normal range of motion.   Skin:     General: Skin is warm. "      Capillary Refill: Capillary refill takes less than 2 seconds.   Neurological:      General: No focal deficit present.      Mental Status: She is alert and oriented to person, place, and time.   Psychiatric:         Mood and Affect: Mood normal.         Behavior: Behavior normal.         Thought Content: Thought content normal.         Judgment: Judgment normal.        Result Review :                 Assessment and Plan    Diagnoses and all orders for this visit:    1. Urinary frequency (Primary)  -     Urinalysis With Microscopic - Urine, Clean Catch; Future    2. Moderate nausea  -     ondansetron (Zofran) 4 MG tablet; Take 1 tablet by mouth Every 8 (Eight) Hours As Needed for Nausea or Vomiting.  Dispense: 30 tablet; Refill: 0    3. HTN (hypertension), malignant    4. Gastroesophageal reflux disease without esophagitis    5. Post-menopausal  -     DEXA Bone Density Axial    Other orders  -     triamcinolone (KENALOG) 0.1 % cream; Apply  topically to the appropriate area as directed 2 (Two) Times a Day.  Dispense: 28.4 g; Refill: 2  -     Fluzone High-Dose 65+yrs (2501-5096)        Follow Up   Return in about 6 months (around 7/13/2022).  Patient was given instructions and counseling regarding her condition or for health maintenance advice. Please see specific information pulled into the AVS if appropriate.

## 2022-01-14 ENCOUNTER — LAB (OUTPATIENT)
Dept: FAMILY MEDICINE CLINIC | Facility: CLINIC | Age: 77
End: 2022-01-14

## 2022-01-14 DIAGNOSIS — R35.0 URINARY FREQUENCY: ICD-10-CM

## 2022-01-14 LAB
BACTERIA UR QL AUTO: ABNORMAL /HPF
BILIRUB UR QL STRIP: ABNORMAL
CLARITY UR: CLEAR
COD CRY URNS QL: ABNORMAL /HPF
COLOR UR: YELLOW
GLUCOSE UR STRIP-MCNC: NEGATIVE MG/DL
HGB UR QL STRIP.AUTO: ABNORMAL
KETONES UR QL STRIP: ABNORMAL
LEUKOCYTE ESTERASE UR QL STRIP.AUTO: NEGATIVE
NITRITE UR QL STRIP: NEGATIVE
PH UR STRIP.AUTO: 5.5 [PH] (ref 4.6–8)
PROT UR QL STRIP: ABNORMAL
RBC # UR STRIP: ABNORMAL /HPF
REF LAB TEST METHOD: ABNORMAL
SP GR UR STRIP: 1.02 (ref 1–1.03)
SQUAMOUS #/AREA URNS HPF: ABNORMAL /HPF
UROBILINOGEN UR QL STRIP: ABNORMAL
WBC # UR STRIP: ABNORMAL /HPF

## 2022-01-14 PROCEDURE — 81001 URINALYSIS AUTO W/SCOPE: CPT | Performed by: NURSE PRACTITIONER

## 2022-01-17 DIAGNOSIS — R35.0 URINARY FREQUENCY: Primary | ICD-10-CM

## 2022-01-17 DIAGNOSIS — R31.9 HEMATURIA, UNSPECIFIED TYPE: ICD-10-CM

## 2022-01-18 ENCOUNTER — TELEPHONE (OUTPATIENT)
Dept: FAMILY MEDICINE CLINIC | Facility: CLINIC | Age: 77
End: 2022-01-18

## 2022-01-18 NOTE — TELEPHONE ENCOUNTER
Hub staff attempted to follow warm transfer process and was unsuccessful     Caller: Jeanie Tristan    Relationship to patient: Self    Best call back number: [phone]    Patient is needing: PLEASE CALL TO RESCHEDULE TODAY'S LABS

## 2022-01-19 ENCOUNTER — LAB (OUTPATIENT)
Dept: FAMILY MEDICINE CLINIC | Facility: CLINIC | Age: 77
End: 2022-01-19

## 2022-01-19 DIAGNOSIS — R31.9 HEMATURIA, UNSPECIFIED TYPE: ICD-10-CM

## 2022-01-19 DIAGNOSIS — A04.8 HELICOBACTER PYLORI (H. PYLORI) INFECTION: Primary | ICD-10-CM

## 2022-01-19 DIAGNOSIS — R35.0 URINARY FREQUENCY: ICD-10-CM

## 2022-01-19 LAB
ERYTHROCYTE [DISTWIDTH] IN BLOOD BY AUTOMATED COUNT: 13 % (ref 12.3–15.4)
HCT VFR BLD AUTO: 40.5 % (ref 34–46.6)
HGB BLD-MCNC: 12.9 G/DL (ref 12–15.9)
LYMPHOCYTES # BLD AUTO: 1.9 10*3/MM3 (ref 0.7–3.1)
LYMPHOCYTES NFR BLD AUTO: 27.4 % (ref 19.6–45.3)
MCH RBC QN AUTO: 26.1 PG (ref 26.6–33)
MCHC RBC AUTO-ENTMCNC: 31.9 G/DL (ref 31.5–35.7)
MCV RBC AUTO: 81.7 FL (ref 79–97)
MONOCYTES # BLD AUTO: 0.6 10*3/MM3 (ref 0.1–0.9)
MONOCYTES NFR BLD AUTO: 8.9 % (ref 5–12)
NEUTROPHILS NFR BLD AUTO: 4.4 10*3/MM3 (ref 1.7–7)
NEUTROPHILS NFR BLD AUTO: 63.7 % (ref 42.7–76)
PLATELET # BLD AUTO: 240 10*3/MM3 (ref 140–450)
PMV BLD AUTO: 8.6 FL (ref 6–12)
RBC # BLD AUTO: 4.95 10*6/MM3 (ref 3.77–5.28)
WBC NRBC COR # BLD: 6.9 10*3/MM3 (ref 3.4–10.8)

## 2022-01-19 PROCEDURE — 85025 COMPLETE CBC W/AUTO DIFF WBC: CPT | Performed by: NURSE PRACTITIONER

## 2022-01-19 PROCEDURE — 36415 COLL VENOUS BLD VENIPUNCTURE: CPT | Performed by: NURSE PRACTITIONER

## 2022-03-30 RX ORDER — CLONIDINE HYDROCHLORIDE 0.2 MG/1
0.2 TABLET ORAL 2 TIMES DAILY
Qty: 180 TABLET | Refills: 1 | Status: SHIPPED | OUTPATIENT
Start: 2022-03-30 | End: 2022-04-05 | Stop reason: SDUPTHER

## 2022-03-30 NOTE — TELEPHONE ENCOUNTER
Caller: Kun Jeanie F    Relationship: Self    Best call back number: [phone]    Requested Prescriptions:   Requested Prescriptions     Pending Prescriptions Disp Refills   • cloNIDine (CATAPRES) 0.2 MG tablet 180 tablet 1     Sig: Take 1 tablet by mouth 2 (Two) Times a Day.        Pharmacy where request should be sent: [facility] #[id number] [address] AT [address] OF [facility] & [facility] - [phone] [facility] [phone] FX     Additional details provided by patient: THE PATIENT IS COMPLETELY OUT OF THIS MEDICATION AND WILL NEED A REFILL ASAP.     Does the patient have less than a 3 day supply:  [x] Yes  [] No    Amairani Valero Rep   03/30/22 16:08 EDT

## 2022-04-01 DIAGNOSIS — R11.0 MODERATE NAUSEA: ICD-10-CM

## 2022-04-01 RX ORDER — BUSPIRONE HYDROCHLORIDE 15 MG/1
15 TABLET ORAL 2 TIMES DAILY
Qty: 180 TABLET | Refills: 3 | Status: CANCELLED | OUTPATIENT
Start: 2022-04-01

## 2022-04-06 RX ORDER — CLONIDINE HYDROCHLORIDE 0.2 MG/1
0.2 TABLET ORAL 2 TIMES DAILY
Qty: 180 TABLET | Refills: 1 | Status: SHIPPED | OUTPATIENT
Start: 2022-04-06 | End: 2022-07-29

## 2022-04-06 RX ORDER — ONDANSETRON 4 MG/1
4 TABLET, FILM COATED ORAL EVERY 8 HOURS PRN
Qty: 30 TABLET | Refills: 1 | Status: SHIPPED | OUTPATIENT
Start: 2022-04-06 | End: 2022-07-29

## 2022-04-06 RX ORDER — FAMOTIDINE 20 MG/1
20 TABLET, FILM COATED ORAL 2 TIMES DAILY
Qty: 180 TABLET | Refills: 2 | Status: SHIPPED | OUTPATIENT
Start: 2022-04-06 | End: 2023-02-08

## 2022-04-06 RX ORDER — BUSPIRONE HYDROCHLORIDE 15 MG/1
15 TABLET ORAL 2 TIMES DAILY
Qty: 180 TABLET | Refills: 2 | Status: SHIPPED | OUTPATIENT
Start: 2022-04-06 | End: 2023-01-13

## 2022-07-21 ENCOUNTER — APPOINTMENT (OUTPATIENT)
Dept: BONE DENSITY | Facility: HOSPITAL | Age: 77
End: 2022-07-21

## 2022-07-29 ENCOUNTER — OFFICE VISIT (OUTPATIENT)
Dept: FAMILY MEDICINE CLINIC | Facility: CLINIC | Age: 77
End: 2022-07-29

## 2022-07-29 VITALS
SYSTOLIC BLOOD PRESSURE: 142 MMHG | DIASTOLIC BLOOD PRESSURE: 92 MMHG | WEIGHT: 179 LBS | HEART RATE: 76 BPM | HEIGHT: 64 IN | BODY MASS INDEX: 30.56 KG/M2 | TEMPERATURE: 98 F | OXYGEN SATURATION: 98 %

## 2022-07-29 DIAGNOSIS — I10 HYPERTENSION, UNSPECIFIED TYPE: ICD-10-CM

## 2022-07-29 DIAGNOSIS — Z00.00 MEDICARE ANNUAL WELLNESS VISIT, SUBSEQUENT: Primary | ICD-10-CM

## 2022-07-29 DIAGNOSIS — K21.9 GASTROESOPHAGEAL REFLUX DISEASE WITHOUT ESOPHAGITIS: ICD-10-CM

## 2022-07-29 DIAGNOSIS — F41.1 GAD (GENERALIZED ANXIETY DISORDER): ICD-10-CM

## 2022-07-29 PROBLEM — K62.3 RECTAL PROLAPSE: Status: ACTIVE | Noted: 2021-09-13

## 2022-07-29 PROBLEM — K64.9 HEMORRHOIDS: Status: ACTIVE | Noted: 2021-09-13

## 2022-07-29 PROBLEM — R53.81 DEBILITY: Status: ACTIVE | Noted: 2021-09-13

## 2022-07-29 PROBLEM — K59.00 CONSTIPATION: Status: ACTIVE | Noted: 2021-09-13

## 2022-07-29 PROCEDURE — 1126F AMNT PAIN NOTED NONE PRSNT: CPT | Performed by: NURSE PRACTITIONER

## 2022-07-29 PROCEDURE — 1170F FXNL STATUS ASSESSED: CPT | Performed by: NURSE PRACTITIONER

## 2022-07-29 PROCEDURE — 96160 PT-FOCUSED HLTH RISK ASSMT: CPT | Performed by: NURSE PRACTITIONER

## 2022-07-29 PROCEDURE — G0439 PPPS, SUBSEQ VISIT: HCPCS | Performed by: NURSE PRACTITIONER

## 2022-07-29 PROCEDURE — 99213 OFFICE O/P EST LOW 20 MIN: CPT | Performed by: NURSE PRACTITIONER

## 2022-07-29 PROCEDURE — 1159F MED LIST DOCD IN RCRD: CPT | Performed by: NURSE PRACTITIONER

## 2022-07-29 RX ORDER — CLONIDINE HYDROCHLORIDE 0.3 MG/1
0.3 TABLET ORAL 2 TIMES DAILY
Qty: 60 TABLET | Refills: 0 | Status: SHIPPED | OUTPATIENT
Start: 2022-07-29 | End: 2022-10-30 | Stop reason: SDUPTHER

## 2022-07-29 NOTE — PROGRESS NOTES
The ABCs of the Annual Wellness Visit  Subsequent Medicare Wellness Visit    Chief Complaint   Patient presents with   • Medicare Wellness-subsequent      Subjective    History of Present Illness:  Jeanie Tristan is a 77 y.o. female who presents for a Subsequent Medicare Wellness Visit and annual labs. Last colonoscopy 2016 with 10 yr repeat. Upcomming appointment for mammogram.     With HTN, not controlled with Clonidine 0.2 mg BID, /92, she has allergy to multiple CP meds, will increase Clonidine to 0.3 mg BID. Denies CP, SOA, HA, dizziness, LE edema.     With GERD, controlled with Pepcid 20 mg QD.    With SURAJ, takes Buspar 15 mg BID.          The following portions of the patient's history were reviewed and   updated as appropriate: allergies, current medications, past family history, past medical history, past social history, past surgical history and problem list.    Compared to one year ago, the patient feels her physical   health is the same.    Compared to one year ago, the patient feels her mental   health is the same.    Recent Hospitalizations:  She was admitted within the past 365 days at [facility].       Current Medical Providers:  Patient Care Team:  Katelin Low APRN as PCP - General (Nurse Practitioner)  Sha Siddiqi MD as Consulting Physician (Hematology and Oncology)  Sabina Cardozo MD (Gastroenterology)    Outpatient Medications Prior to Visit   Medication Sig Dispense Refill   • acetaminophen (TYLENOL) 500 MG tablet Take 500 mg by mouth Every 6 (Six) Hours As Needed for Mild Pain .     • busPIRone (BUSPAR) 15 MG tablet Take 1 tablet by mouth 2 (Two) Times a Day. 180 tablet 2   • Calcium Carbonate-Vitamin D (OSCAL-500) 500-400 MG-UNIT per tablet Take  by mouth.     • famotidine (PEPCID) 20 MG tablet Take 1 tablet by mouth 2 (Two) Times a Day. 180 tablet 2   • vitamin B-12 (CYANOCOBALAMIN) 100 MCG tablet      • anastrozole (ARIMIDEX) 1 MG tablet Take 1 mg by  "mouth.     • cloNIDine (CATAPRES) 0.2 MG tablet Take 1 tablet by mouth 2 (Two) Times a Day. 180 tablet 1   • calcium carbonate (TUMS) 500 MG chewable tablet      • ondansetron (Zofran) 4 MG tablet Take 1 tablet by mouth Every 8 (Eight) Hours As Needed for Nausea or Vomiting. 30 tablet 1   • triamcinolone (KENALOG) 0.1 % cream Apply  topically to the appropriate area as directed 2 (Two) Times a Day. 28.4 g 2     No facility-administered medications prior to visit.       No opioid medication identified on active medication list. I have reviewed chart for other potential  high risk medication/s and harmful drug interactions in the elderly.          Aspirin is not on active medication list.  Aspirin use is not indicated based on review of current medical condition/s. Risk of harm outweighs potential benefits.  .    Patient Active Problem List   Diagnosis   • GERD (gastroesophageal reflux disease)   • HTN (hypertension), malignant   • Breast cancer, left breast (HCC)   • Obesity (BMI 30-39.9)   • Primary osteoarthritis of both knees   • Generalized anxiety disorder   • Constipation   • Debility   • Hemorrhoids   • Rectal prolapse     Advance Care Planning  Advance Directive is on file.  ACP discussion was held with the patient during this visit. Patient has an advance directive in EMR which is still valid.           Objective    Vitals:    07/29/22 1459   BP: 142/92   BP Location: Left arm   Patient Position: Sitting   Cuff Size: Large Adult   Pulse: 76   Temp: 98 °F (36.7 °C)   TempSrc: Infrared   SpO2: 98%   Weight: 81.2 kg (179 lb)   Height: 162.6 cm (64.02\")   PainSc: 0-No pain     Estimated body mass index is 30.71 kg/m² as calculated from the following:    Height as of this encounter: 162.6 cm (64.02\").    Weight as of this encounter: 81.2 kg (179 lb).    BMI is >= 30 and <35. (Class 1 Obesity). The following options were offered after discussion;: weight loss educational material (shared in after visit " summary)      Does the patient have evidence of cognitive impairment? No    Physical Exam  Cardiovascular:      Rate and Rhythm: Normal rate.      Pulses: Normal pulses.      Heart sounds: Normal heart sounds.   Pulmonary:      Effort: Pulmonary effort is normal.      Breath sounds: Normal breath sounds.   Abdominal:      General: Bowel sounds are normal.      Palpations: Abdomen is soft.   Neurological:      General: No focal deficit present.      Mental Status: She is alert and oriented to person, place, and time.   Psychiatric:         Mood and Affect: Mood normal.         Behavior: Behavior normal.                 HEALTH RISK ASSESSMENT    Smoking Status:  Social History     Tobacco Use   Smoking Status Former Smoker   Smokeless Tobacco Never Used     Alcohol Consumption:  Social History     Substance and Sexual Activity   Alcohol Use No     Fall Risk Screen:    STEADI Fall Risk Assessment was completed, and patient is at MODERATE risk for falls. Assessment completed on:7/29/2022    Depression Screening:  PHQ-2/PHQ-9 Depression Screening 1/13/2022   Retired PHQ-9 Total Score 0   Retired Total Score 0       Health Habits and Functional and Cognitive Screening:  Functional & Cognitive Status 7/29/2022   Do you have difficulty preparing food and eating? No   Do you have difficulty bathing yourself, getting dressed or grooming yourself? No   Do you have difficulty using the toilet? No   Do you have difficulty moving around from place to place? Yes   Do you have trouble with steps or getting out of a bed or a chair? Yes   Current Diet Well Balanced Diet   Dental Exam Not up to date   Eye Exam Not up to date   Exercise (times per week) 7 times per week   Current Exercises Include Walking   Current Exercise Activities Include -   Do you need help using the phone?  No   Are you deaf or do you have serious difficulty hearing?  No   Do you need help with transportation? No   Do you need help shopping? No   Do you need  help preparing meals?  No   Do you need help with housework?  No   Do you need help with laundry? No   Do you need help taking your medications? No   Do you need help managing money? No   Do you ever drive or ride in a car without wearing a seat belt? No   Have you felt unusual stress, anger or loneliness in the last month? No   Who do you live with? Child   If you need help, do you have trouble finding someone available to you? No   Have you been bothered in the last four weeks by sexual problems? No   Do you have difficulty concentrating, remembering or making decisions? No       Age-appropriate Screening Schedule:  Refer to the list below for future screening recommendations based on patient's age, sex and/or medical conditions. Orders for these recommended tests are listed in the plan section. The patient has been provided with a written plan.    Health Maintenance   Topic Date Due   • DXA SCAN  Never done   • MAMMOGRAM  04/05/2020   • TDAP/TD VACCINES (1 - Tdap) 01/13/2023 (Originally 3/21/1964)   • ZOSTER VACCINE (2 of 2) 01/13/2023 (Originally 2/26/2015)   • INFLUENZA VACCINE  10/01/2022              Assessment & Plan   CMS Preventative Services Quick Reference  Risk Factors Identified During Encounter  Cardiovascular Disease  Fall Risk-High or Moderate  The above risks/problems have been discussed with the patient.  Follow up actions/plans if indicated are seen below in the Assessment/Plan Section.  Pertinent information has been shared with the patient in the After Visit Summary.    Diagnoses and all orders for this visit:    1. Medicare annual wellness visit, subsequent (Primary)  -     Lipid Panel; Future  -     CBC & Differential; Future  -     Comprehensive metabolic panel; Future  -     Thyroid Panel With TSH; Future    2. Gastroesophageal reflux disease without esophagitis  -     CBC & Differential; Future  -     Comprehensive metabolic panel; Future    3. SURAJ (generalized anxiety disorder)  -     CBC  & Differential; Future  -     Comprehensive metabolic panel; Future  -     cloNIDine (Catapres) 0.3 MG tablet; Take 1 tablet by mouth 2 (Two) Times a Day.  Dispense: 60 tablet; Refill: 0    4. Hypertension, unspecified type  -     cloNIDine (Catapres) 0.3 MG tablet; Take 1 tablet by mouth 2 (Two) Times a Day.  Dispense: 60 tablet; Refill: 0    Counseling was provided on nutrition, physical activity, injury prevention, eye and dental health, patient verbalizes understanding no additional questions were asked.    Follow Up:   Return in about 2 weeks (around 8/12/2022) for Recheck.     An After Visit Summary and PPPS were made available to the patient.                 Mask, gloves and Goggles worn

## 2022-10-30 DIAGNOSIS — I10 HYPERTENSION, UNSPECIFIED TYPE: ICD-10-CM

## 2022-10-30 DIAGNOSIS — F41.1 GAD (GENERALIZED ANXIETY DISORDER): ICD-10-CM

## 2022-10-30 RX ORDER — CLONIDINE HYDROCHLORIDE 0.3 MG/1
0.3 TABLET ORAL 2 TIMES DAILY
Qty: 60 TABLET | Refills: 2 | Status: SHIPPED | OUTPATIENT
Start: 2022-10-30 | End: 2022-11-01 | Stop reason: SDUPTHER

## 2022-10-31 RX ORDER — CLONIDINE HYDROCHLORIDE 0.2 MG/1
TABLET ORAL
Qty: 180 TABLET | Refills: 1 | OUTPATIENT
Start: 2022-10-31

## 2022-11-01 DIAGNOSIS — I10 HYPERTENSION, UNSPECIFIED TYPE: ICD-10-CM

## 2022-11-01 DIAGNOSIS — F41.1 GAD (GENERALIZED ANXIETY DISORDER): ICD-10-CM

## 2022-11-01 RX ORDER — CLONIDINE HYDROCHLORIDE 0.3 MG/1
0.3 TABLET ORAL 2 TIMES DAILY
Qty: 60 TABLET | Refills: 2 | Status: SHIPPED | OUTPATIENT
Start: 2022-11-01 | End: 2023-01-17 | Stop reason: SDUPTHER

## 2022-11-01 NOTE — TELEPHONE ENCOUNTER
Medication was sent to local pharm, please resend to [facility] mail order

## 2023-01-13 RX ORDER — BUSPIRONE HYDROCHLORIDE 15 MG/1
TABLET ORAL
Qty: 180 TABLET | Refills: 0 | Status: SHIPPED | OUTPATIENT
Start: 2023-01-13

## 2023-01-17 DIAGNOSIS — F41.1 GAD (GENERALIZED ANXIETY DISORDER): ICD-10-CM

## 2023-01-17 DIAGNOSIS — I10 HYPERTENSION, UNSPECIFIED TYPE: ICD-10-CM

## 2023-01-17 RX ORDER — CLONIDINE HYDROCHLORIDE 0.3 MG/1
0.3 TABLET ORAL 2 TIMES DAILY
Qty: 60 TABLET | Refills: 2 | Status: SHIPPED | OUTPATIENT
Start: 2023-01-17

## 2023-01-17 NOTE — TELEPHONE ENCOUNTER
Caller: KunMaria Del Carmena NICK    Relationship: Self    Best call back number: [phone]    Requested Prescriptions:   Requested Prescriptions     Pending Prescriptions Disp Refills   • cloNIDine (Catapres) 0.3 MG tablet 60 tablet 2     Sig: Take 1 tablet by mouth 2 (Two) Times a Day.        Pharmacy where request should be sent: [facility] - [facility] [address] - [phone]  - [phone] FX     Additional details provided by patient: PATIENT IS REQUESTING TO GO BACK TO A LOWER DOSE OF THIS MEDICATION DUE TO THE SIDE EFFECTS SHE EXPERIENCED ON THE CURRENT DOSE.     PLEASE ADVISE     Amairani Vargas Rep   01/17/23 14:23 EST

## 2023-02-08 RX ORDER — FAMOTIDINE 20 MG/1
TABLET, FILM COATED ORAL
Qty: 180 TABLET | Refills: 2 | Status: SHIPPED | OUTPATIENT
Start: 2023-02-08

## 2023-04-19 DIAGNOSIS — I10 HYPERTENSION, UNSPECIFIED TYPE: ICD-10-CM

## 2023-04-19 DIAGNOSIS — F41.1 GAD (GENERALIZED ANXIETY DISORDER): ICD-10-CM

## 2023-04-19 RX ORDER — CLONIDINE HYDROCHLORIDE 0.3 MG/1
0.3 TABLET ORAL 2 TIMES DAILY
Qty: 60 TABLET | Refills: 2 | Status: SHIPPED | OUTPATIENT
Start: 2023-04-19

## 2023-04-19 NOTE — TELEPHONE ENCOUNTER
"    Caller: Jeanie Tristan \"Jan\"    Relationship: Self    Best call back number: [phone]    Requested Prescriptions:   Requested Prescriptions     Pending Prescriptions Disp Refills   • cloNIDine (Catapres) 0.3 MG tablet 60 tablet 2     Sig: Take 1 tablet by mouth 2 (Two) Times a Day.        Pharmacy where request should be sent: [facility] [address] - [phone] [facility] [phone]      Last office visit with prescribing clinician: Visit date not found   Last telemedicine visit with prescribing clinician: Visit date not found   Next office visit with prescribing clinician: Visit date not found     Additional details provided by patient: PATIENT IS REQUESTING 0.2 MG OF THIS MEDICATION, 0.3 MG IS TOO STRONG.     Does the patient have less than a 3 day supply:  [] Yes  [x] No    Would you like a call back once the refill request has been completed: [] Yes [x] No    If the office needs to give you a call back, can they leave a voicemail: [] Yes [x] No    Amairani Clarke Rep   04/19/23 13:55 EDT       "

## 2023-04-24 RX ORDER — CLONIDINE HYDROCHLORIDE 0.3 MG/1
TABLET ORAL
Qty: 180 TABLET | OUTPATIENT
Start: 2023-04-24

## 2023-04-24 NOTE — TELEPHONE ENCOUNTER
Caller: [facility] - [facility] [address] - [phone] [facility] [phone] FX    Relationship:     Best call back number:[phone]    What medications are you currently taking:   Current Outpatient Medications on File Prior to Visit   Medication Sig Dispense Refill   • acetaminophen (TYLENOL) 500 MG tablet Take 500 mg by mouth Every 6 (Six) Hours As Needed for Mild Pain .     • busPIRone (BUSPAR) 15 MG tablet TAKE 1 TABLET TWICE DAILY 180 tablet 0   • Calcium Carbonate-Vitamin D (OSCAL-500) 500-400 MG-UNIT per tablet Take  by mouth.     • cloNIDine (Catapres) 0.3 MG tablet Take 1 tablet by mouth 2 (Two) Times a Day. 60 tablet 2   • famotidine (PEPCID) 20 MG tablet TAKE 1 TABLET TWICE DAILY 180 tablet 2   • vitamin B-12 (CYANOCOBALAMIN) 100 MCG tablet        No current facility-administered medications on file prior to visit.      When did you start taking these medications: OVER 1MONTH    Which medication are you concerned about: CLONIDINE .3MG     Who prescribed you this medication: TENZIN KUO    What are your concerns: TOO STRONG. WANTING TO SLEEP ALL THE TIME     How long have you had these concerns: FROM THE FIRST TIME SHE STARTED TAKING THIS.     PATIENT IS WANTING A REFILL OF .2MG

## 2024-11-08 ENCOUNTER — ON CAMPUS - OUTPATIENT (OUTPATIENT)
Age: 79
End: 2024-11-08
Payer: MEDICAID

## 2024-11-08 ENCOUNTER — ON CAMPUS - OUTPATIENT (OUTPATIENT)
Dept: URBAN - METROPOLITAN AREA HOSPITAL 108 | Facility: HOSPITAL | Age: 79
End: 2024-11-08
Payer: MEDICAID

## 2024-11-08 DIAGNOSIS — K62.3 RECTAL PROLAPSE: ICD-10-CM

## 2024-11-08 DIAGNOSIS — R93.3 ABNORMAL FINDINGS ON DIAGNOSTIC IMAGING OF OTHER PARTS OF DI: ICD-10-CM

## 2024-11-08 DIAGNOSIS — K56.41 FECAL IMPACTION: ICD-10-CM

## 2024-11-08 PROCEDURE — 99223 1ST HOSP IP/OBS HIGH 75: CPT | Performed by: INTERNAL MEDICINE

## 2024-11-09 ENCOUNTER — ON CAMPUS - OUTPATIENT (OUTPATIENT)
Age: 79
End: 2024-11-09
Payer: MEDICAID

## 2024-11-09 ENCOUNTER — ON CAMPUS - OUTPATIENT (OUTPATIENT)
Dept: URBAN - METROPOLITAN AREA HOSPITAL 108 | Facility: HOSPITAL | Age: 79
End: 2024-11-09
Payer: MEDICAID

## 2024-11-09 DIAGNOSIS — K59.00 CONSTIPATION, UNSPECIFIED: ICD-10-CM

## 2024-11-09 DIAGNOSIS — K21.9 GASTRO-ESOPHAGEAL REFLUX DISEASE WITHOUT ESOPHAGITIS: ICD-10-CM

## 2024-11-09 DIAGNOSIS — K62.89 OTHER SPECIFIED DISEASES OF ANUS AND RECTUM: ICD-10-CM

## 2024-11-09 PROCEDURE — 99232 SBSQ HOSP IP/OBS MODERATE 35: CPT | Performed by: INTERNAL MEDICINE

## 2024-11-11 ENCOUNTER — ON CAMPUS - OUTPATIENT (OUTPATIENT)
Dept: URBAN - METROPOLITAN AREA HOSPITAL 108 | Facility: HOSPITAL | Age: 79
End: 2024-11-11
Payer: MEDICAID

## 2024-11-11 ENCOUNTER — ON CAMPUS - OUTPATIENT (OUTPATIENT)
Age: 79
End: 2024-11-11
Payer: MEDICAID

## 2024-11-11 DIAGNOSIS — R93.3 ABNORMAL FINDINGS ON DIAGNOSTIC IMAGING OF OTHER PARTS OF DI: ICD-10-CM

## 2024-11-11 DIAGNOSIS — K56.41 FECAL IMPACTION: ICD-10-CM

## 2024-11-11 DIAGNOSIS — K62.3 RECTAL PROLAPSE: ICD-10-CM

## 2024-11-11 PROCEDURE — 99233 SBSQ HOSP IP/OBS HIGH 50: CPT | Performed by: PHYSICIAN ASSISTANT
